# Patient Record
Sex: MALE | Race: WHITE | NOT HISPANIC OR LATINO | Employment: OTHER | URBAN - METROPOLITAN AREA
[De-identification: names, ages, dates, MRNs, and addresses within clinical notes are randomized per-mention and may not be internally consistent; named-entity substitution may affect disease eponyms.]

---

## 2017-02-03 ENCOUNTER — LAB REQUISITION (OUTPATIENT)
Dept: LAB | Facility: HOSPITAL | Age: 62
End: 2017-02-03

## 2017-02-03 DIAGNOSIS — S60.549A: ICD-10-CM

## 2017-02-03 LAB — POTASSIUM BLDA-SCNC: 3.93 MMOL/L (ref 3.5–5.3)

## 2017-02-03 PROCEDURE — 84132 ASSAY OF SERUM POTASSIUM: CPT | Performed by: PLASTIC SURGERY

## 2017-04-21 ENCOUNTER — HOSPITAL ENCOUNTER (OUTPATIENT)
Dept: CT IMAGING | Facility: HOSPITAL | Age: 62
Discharge: HOME | End: 2017-04-21
Attending: INTERNAL MEDICINE

## 2017-05-31 ENCOUNTER — APPOINTMENT (OUTPATIENT)
Dept: CT IMAGING | Facility: HOSPITAL | Age: 62
End: 2017-05-31
Attending: INTERNAL MEDICINE

## 2017-05-31 ENCOUNTER — HOSPITAL ENCOUNTER (OUTPATIENT)
Dept: CT IMAGING | Facility: HOSPITAL | Age: 62
Discharge: HOME | End: 2017-05-31
Attending: INTERNAL MEDICINE | Admitting: INTERNAL MEDICINE

## 2017-05-31 DIAGNOSIS — R91.8 LUNG NODULE, MULTIPLE: ICD-10-CM

## 2017-05-31 DIAGNOSIS — R93.89 ABNORMAL CT OF THE CHEST: ICD-10-CM

## 2017-05-31 PROCEDURE — 71250 CT THORAX DX C-: CPT

## 2017-06-06 ENCOUNTER — OFFICE VISIT (OUTPATIENT)
Dept: PULMONOLOGY | Facility: CLINIC | Age: 62
End: 2017-06-06

## 2017-06-06 VITALS
SYSTOLIC BLOOD PRESSURE: 110 MMHG | WEIGHT: 230 LBS | DIASTOLIC BLOOD PRESSURE: 64 MMHG | OXYGEN SATURATION: 98 % | RESPIRATION RATE: 16 BRPM | HEART RATE: 81 BPM | BODY MASS INDEX: 30.48 KG/M2 | HEIGHT: 73 IN

## 2017-06-06 DIAGNOSIS — R93.89 ABNORMAL CT OF THE CHEST: ICD-10-CM

## 2017-06-06 DIAGNOSIS — R06.02 SHORTNESS OF BREATH: Primary | ICD-10-CM

## 2017-06-06 DIAGNOSIS — R91.8 LUNG NODULE, MULTIPLE: ICD-10-CM

## 2017-06-06 DIAGNOSIS — J44.9 CHRONIC OBSTRUCTIVE PULMONARY DISEASE, UNSPECIFIED COPD TYPE (HCC): ICD-10-CM

## 2017-06-06 DIAGNOSIS — F17.200 SMOKING: ICD-10-CM

## 2017-06-06 PROCEDURE — 99214 OFFICE O/P EST MOD 30 MIN: CPT | Performed by: NURSE PRACTITIONER

## 2017-06-06 RX ORDER — AMLODIPINE BESYLATE 5 MG/1
TABLET ORAL
COMMUNITY
Start: 2017-05-11

## 2017-06-06 RX ORDER — AZILSARTAN KAMEDOXOMIL AND CHLORTHALIDONE 40; 25 MG/1; MG/1
TABLET ORAL
Refills: 6 | COMMUNITY
Start: 2017-05-12

## 2017-06-06 RX ORDER — ALBUTEROL SULFATE 90 UG/1
2 AEROSOL, METERED RESPIRATORY (INHALATION) EVERY 4 HOURS PRN
Qty: 1 INHALER | Refills: 5 | Status: SHIPPED | OUTPATIENT
Start: 2017-06-06 | End: 2017-12-19 | Stop reason: SDUPTHER

## 2017-06-06 NOTE — PROGRESS NOTES
"Chief Complaint   Patient presents with   • Follow-up   • Shortness of Breath         Subjective   Stephan Gibbs is a 61 y.o. male.     History of Present Illness   The patient is here for follow-up of shortness of breath, COPD, and lung nodule.     He is doing well with the medication. He uses Qvar 1 puff BID and Stiolto 1 puff BID. He feels like his breathing is doing well. He has not needed a rescue inhaler at all.     He denies any exacerbation or increased cough or phlegm production.     He continues to smoke 1/2 ppd of cigarettes.       The following portions of the patient's history were reviewed and updated as appropriate: allergies, current medications, past family history, past medical history, past social history and past surgical history.    Review of Systems   HENT: Negative for sinus pressure, sneezing and sore throat.    Respiratory: Positive for shortness of breath and wheezing. Negative for cough.        Objective   /64  Pulse 81  Resp 16  Ht 73\" (185.4 cm)  Wt 230 lb (104 kg)  SpO2 98%  BMI 30.34 kg/m2     Physical Exam   Constitutional: He is oriented to person, place, and time. He appears well-developed.   HENT:   Head: Normocephalic and atraumatic.   Eyes: EOM are normal.   Neck: Neck supple.   Cardiovascular: Normal rate and regular rhythm.    Pulmonary/Chest: Effort normal. No respiratory distress.   Somewhat decreased A/E without wheezing noted.   Musculoskeletal: He exhibits no edema.   Gait normal.   Neurological: He is alert and oriented to person, place, and time.   Skin: Skin is warm and dry.   Psychiatric: He has a normal mood and affect.   Vitals reviewed.          Assessment/Plan   Stephan was seen today for follow-up and shortness of breath.    Diagnoses and all orders for this visit:    Shortness of breath  -     Alpha - 1 - Antitrypsin Phenotype; Future    Chronic obstructive pulmonary disease, unspecified COPD type  -     Alpha - 1 - Antitrypsin Phenotype; " Future    Abnormal CT of the chest    Lung nodule, multiple    Smoking    Other orders  -     albuterol (VENTOLIN HFA) 108 (90 BASE) MCG/ACT inhaler; Inhale 2 puffs Every 4 (Four) Hours As Needed for Wheezing or Shortness of Air.           Return in about 6 months (around 12/6/2017) for Recheck, For Dr. Corey.    DISCUSSION (if any):  I reviewed the CT of the chest with the patient which shows a stable 5-6 mm nodule and will repeat December/January.    Continue Stiolto daily and Qvar twice a day.    I have given him a rescue inhaler to use as needed and to carry with him.    I advised him to quit smoking.    He did not have the lab work done so I have reordered alpha-1 antitrypsin with phenotype to be completed.        Errors in dictation may reflect use of voice recognition software and not all errors in transcription may have been detected prior to signing    This document was electronically signed by ALEXIA Barrios June 6, 2017  10:04 AM

## 2017-06-11 ENCOUNTER — RESULTS ENCOUNTER (OUTPATIENT)
Dept: PULMONOLOGY | Facility: CLINIC | Age: 62
End: 2017-06-11

## 2017-06-11 DIAGNOSIS — J44.9 CHRONIC OBSTRUCTIVE PULMONARY DISEASE, UNSPECIFIED COPD TYPE (HCC): ICD-10-CM

## 2017-06-11 DIAGNOSIS — R06.02 SHORTNESS OF BREATH: ICD-10-CM

## 2017-06-13 ENCOUNTER — LAB (OUTPATIENT)
Dept: LAB | Facility: HOSPITAL | Age: 62
End: 2017-06-13
Attending: INTERNAL MEDICINE

## 2017-06-13 ENCOUNTER — PROCEDURE VISIT (OUTPATIENT)
Dept: NEUROLOGY | Facility: CLINIC | Age: 62
End: 2017-06-13

## 2017-06-13 DIAGNOSIS — R20.0 NUMBNESS AND TINGLING: Primary | ICD-10-CM

## 2017-06-13 DIAGNOSIS — R20.2 NUMBNESS AND TINGLING: Primary | ICD-10-CM

## 2017-06-13 DIAGNOSIS — J44.9 CHRONIC OBSTRUCTIVE PULMONARY DISEASE, UNSPECIFIED COPD TYPE (HCC): ICD-10-CM

## 2017-06-13 PROCEDURE — 82104 ALPHA-1-ANTITRYPSIN PHENO: CPT | Performed by: INTERNAL MEDICINE

## 2017-06-13 PROCEDURE — 95886 MUSC TEST DONE W/N TEST COMP: CPT | Performed by: PSYCHIATRY & NEUROLOGY

## 2017-06-13 PROCEDURE — 95911 NRV CNDJ TEST 9-10 STUDIES: CPT | Performed by: PSYCHIATRY & NEUROLOGY

## 2017-06-13 PROCEDURE — 82103 ALPHA-1-ANTITRYPSIN TOTAL: CPT | Performed by: INTERNAL MEDICINE

## 2017-06-13 NOTE — PROGRESS NOTES
"Procedure   EMG & Nerve Conduction Test  Date/Time: 6/13/2017 3:12 PM  Performed by: AVTRA COOK  Authorized by: AVTAR COOK   Consent: Verbal consent obtained.              [facility]   [address], [address]  [place]  [address]   Phone: [phone]  FAX: [phone]           Patient: tushar lane YOB: 1955  Gender: Male  Reason for study: see below in history section      Visit Date: 6/13/2017 15:20  Age: 61 Years 9 Months Old  Examining Physician: Kam       The patient has a chief complaint and history of bilateral, lower extremity, numbness,, pain,    The patient is referred to have this problem evaluated today with EMG and nerve conduction studies.  The performing physician also acted as a technician on this study.  Please note that in the table \"NR\" stands for \"no response\" therefore testing was performed, but no response was elicited.    A brief neurological exam, revealed no abnormalities in muscle bulk strength reflexes and sensationbilateral, lower extremity, numbness,      Sensory NCS      Nerve / Sites Rec. Site Distance Onset Lat NP Amp Onset Shane     cm ms µV m/s   L Sural - Ankle (Calf)      Calf Ankle 14 3.9 1.8 36   R Sural - Ankle (Calf)      Calf Ankle 14 4.0 1.1 35   L Superficial peroneal - Ankle      Lat leg Ankle 8 2.7 1.2 30       Motor NCS      Nerve / Sites Muscle Distance Latency Amplitude Velocity     cm ms mV m/s   L Peroneal - EDB      Ankle EDB 8 11.41 2.1       Fib head EDB 30 19.48 2.0 37   R Peroneal - EDB      Ankle EDB 8 4.84 5.0       Fib head EDB 30 13.39 4.5 35   L Tibial - AH      Ankle AH 8 4.17 2.6       Pop fossa AH 43 15.89 2.2 37   R Tibial - AH      Ankle AH 8 6.72 1.7       Pop fossa AH 43 18.28 1.7 37       F  Wave      Nerve F-min    ms   L Tibial - AH 53   L Peroneal - EDB 67   R Peroneal - EDB 61   R Tibial - AH 65       H Reflex      Nerve H Lat    ms   L Tibial - Soleus 47.9   R Tibial - Soleus " 45.5       EMG Summary Table     Spontaneous MUAP Recruitment    IA Fib PSW Fasc H.F. Amp Dur. PPP Pattern   L. GASTROCN (MED) N None None None None N N N N   L. TIB ANTERIOR N None None None None N N N N   L. ILIOPSOAS N None None None None N N N N   L. QUADRICEPS N None None None None N N N N   L. GLUTEUS MAX N None None None None N N N N   L. L.PARASPINALS N None None None None N N N N   R. L.PARASPINALS N None None None None N N N N   R. GASTROCN (MED) N None None None None N N N N   R. TIB ANTERIOR N None None None None N N N N   R. ILIOPSOAS N None None None None N N N N   R. QUADRICEPS N None None None None N N N N   R. GLUTEUS MAX N None None None None N N N N   1.) SNAPs were mildly slow.  2.) CMAPs were normal  3.) F-waves were of normal minimal latency where elicitable.  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    The referring health care provider will discuss this report and possible further diagnostic and management options with the patient.      Elver Humphrey M.D.                     Diagnoses and all orders for this visit:    Numbness and tingling  -     EMG & Nerve Conduction Test

## 2017-06-16 LAB
A1AT SERPL-MCNC: 82 MG/DL (ref 90–200)
PHENOTYPE: ABNORMAL

## 2017-12-19 RX ORDER — ALBUTEROL SULFATE 90 UG/1
2 AEROSOL, METERED RESPIRATORY (INHALATION) EVERY 4 HOURS PRN
Qty: 1 INHALER | Refills: 0 | Status: SHIPPED | OUTPATIENT
Start: 2017-12-19 | End: 2022-09-13

## 2017-12-19 RX ORDER — TIOTROPIUM BROMIDE AND OLODATEROL 3.124; 2.736 UG/1; UG/1
2 SPRAY, METERED RESPIRATORY (INHALATION) DAILY
Qty: 1 INHALER | Refills: 0 | Status: SHIPPED | OUTPATIENT
Start: 2017-12-19 | End: 2017-12-27 | Stop reason: SDUPTHER

## 2017-12-27 ENCOUNTER — OFFICE VISIT (OUTPATIENT)
Dept: PULMONOLOGY | Facility: CLINIC | Age: 62
End: 2017-12-27

## 2017-12-27 VITALS
HEART RATE: 103 BPM | BODY MASS INDEX: 31.14 KG/M2 | DIASTOLIC BLOOD PRESSURE: 72 MMHG | HEIGHT: 73 IN | RESPIRATION RATE: 16 BRPM | WEIGHT: 235 LBS | SYSTOLIC BLOOD PRESSURE: 110 MMHG | OXYGEN SATURATION: 97 %

## 2017-12-27 DIAGNOSIS — J44.9 CHRONIC OBSTRUCTIVE PULMONARY DISEASE, UNSPECIFIED COPD TYPE (HCC): ICD-10-CM

## 2017-12-27 DIAGNOSIS — R91.1 LUNG NODULE: ICD-10-CM

## 2017-12-27 DIAGNOSIS — R06.02 SHORTNESS OF BREATH: Primary | ICD-10-CM

## 2017-12-27 DIAGNOSIS — R93.89 ABNORMAL CT OF THE CHEST: ICD-10-CM

## 2017-12-27 DIAGNOSIS — F17.200 SMOKING: ICD-10-CM

## 2017-12-27 PROCEDURE — 99214 OFFICE O/P EST MOD 30 MIN: CPT | Performed by: NURSE PRACTITIONER

## 2017-12-27 RX ORDER — TIOTROPIUM BROMIDE AND OLODATEROL 3.124; 2.736 UG/1; UG/1
2 SPRAY, METERED RESPIRATORY (INHALATION) DAILY
Qty: 1 INHALER | Refills: 5 | Status: SHIPPED | OUTPATIENT
Start: 2017-12-27 | End: 2022-09-13

## 2018-01-26 ENCOUNTER — HOSPITAL ENCOUNTER (OUTPATIENT)
Dept: CT IMAGING | Facility: HOSPITAL | Age: 63
Discharge: HOME | End: 2018-01-26

## 2018-01-30 ENCOUNTER — HOSPITAL ENCOUNTER (OUTPATIENT)
Dept: CT IMAGING | Facility: HOSPITAL | Age: 63
Discharge: HOME | End: 2018-01-30
Admitting: NURSE PRACTITIONER

## 2018-01-30 DIAGNOSIS — R91.1 LUNG NODULE: ICD-10-CM

## 2018-01-30 DIAGNOSIS — R93.89 ABNORMAL CT OF THE CHEST: ICD-10-CM

## 2018-01-30 PROCEDURE — 71250 CT THORAX DX C-: CPT

## 2018-01-31 NOTE — PROGRESS NOTES
Please call the patient regarding his CT report. The nodule has decreased in size otherwise there is no change from previous CT.

## 2018-11-20 RX ORDER — TIOTROPIUM BROMIDE AND OLODATEROL 3.124; 2.736 UG/1; UG/1
SPRAY, METERED RESPIRATORY (INHALATION)
Refills: 4 | OUTPATIENT
Start: 2018-11-20

## 2018-11-29 RX ORDER — TIOTROPIUM BROMIDE AND OLODATEROL 3.124; 2.736 UG/1; UG/1
SPRAY, METERED RESPIRATORY (INHALATION)
Refills: 4 | OUTPATIENT
Start: 2018-11-29

## 2018-12-04 RX ORDER — TIOTROPIUM BROMIDE AND OLODATEROL 3.124; 2.736 UG/1; UG/1
SPRAY, METERED RESPIRATORY (INHALATION)
Refills: 4 | OUTPATIENT
Start: 2018-12-04

## 2020-05-22 ENCOUNTER — TRANSCRIBE ORDERS (OUTPATIENT)
Dept: ADMINISTRATIVE | Facility: HOSPITAL | Age: 65
End: 2020-05-22

## 2020-05-22 DIAGNOSIS — Z87.891 PERSONAL HISTORY OF TOBACCO USE, PRESENTING HAZARDS TO HEALTH: Primary | ICD-10-CM

## 2020-06-05 ENCOUNTER — HOSPITAL ENCOUNTER (OUTPATIENT)
Dept: CT IMAGING | Facility: HOSPITAL | Age: 65
Discharge: HOME | End: 2020-06-05
Admitting: FAMILY MEDICINE

## 2020-06-05 DIAGNOSIS — Z87.891 PERSONAL HISTORY OF TOBACCO USE, PRESENTING HAZARDS TO HEALTH: ICD-10-CM

## 2020-06-05 PROCEDURE — G0297 LDCT FOR LUNG CA SCREEN: HCPCS

## 2020-11-30 ENCOUNTER — TRANSCRIBE ORDERS (OUTPATIENT)
Dept: ADMINISTRATIVE | Facility: HOSPITAL | Age: 65
End: 2020-11-30

## 2020-11-30 DIAGNOSIS — Z87.891 PERSONAL HISTORY OF TOBACCO USE, PRESENTING HAZARDS TO HEALTH: Primary | ICD-10-CM

## 2021-06-16 ENCOUNTER — HOSPITAL ENCOUNTER (OUTPATIENT)
Dept: CT IMAGING | Facility: HOSPITAL | Age: 66
Discharge: HOME | End: 2021-06-16
Admitting: FAMILY MEDICINE

## 2021-06-16 DIAGNOSIS — Z87.891 PERSONAL HISTORY OF TOBACCO USE, PRESENTING HAZARDS TO HEALTH: ICD-10-CM

## 2021-06-16 PROCEDURE — 71271 CT THORAX LUNG CANCER SCR C-: CPT

## 2022-09-13 ENCOUNTER — OFFICE VISIT (OUTPATIENT)
Dept: PULMONOLOGY | Facility: CLINIC | Age: 67
End: 2022-09-13

## 2022-09-13 VITALS
DIASTOLIC BLOOD PRESSURE: 64 MMHG | OXYGEN SATURATION: 93 % | SYSTOLIC BLOOD PRESSURE: 110 MMHG | HEART RATE: 100 BPM | HEIGHT: 73 IN | BODY MASS INDEX: 31.41 KG/M2 | WEIGHT: 237 LBS | RESPIRATION RATE: 25 BRPM

## 2022-09-13 DIAGNOSIS — J44.9 CHRONIC OBSTRUCTIVE PULMONARY DISEASE, UNSPECIFIED COPD TYPE: ICD-10-CM

## 2022-09-13 DIAGNOSIS — R06.02 SHORTNESS OF BREATH: Primary | ICD-10-CM

## 2022-09-13 DIAGNOSIS — F17.210 NICOTINE DEPENDENCE, CIGARETTES, UNCOMPLICATED: ICD-10-CM

## 2022-09-13 DIAGNOSIS — J41.0 CHRONIC BRONCHITIS, SIMPLE: ICD-10-CM

## 2022-09-13 DIAGNOSIS — Z14.8 ALPHA-1-ANTITRYPSIN DEFICIENCY CARRIER: ICD-10-CM

## 2022-09-13 PROCEDURE — 99204 OFFICE O/P NEW MOD 45 MIN: CPT | Performed by: INTERNAL MEDICINE

## 2022-09-13 RX ORDER — TAMSULOSIN HYDROCHLORIDE 0.4 MG/1
CAPSULE ORAL
COMMUNITY
Start: 2022-09-07

## 2022-09-13 RX ORDER — ALBUTEROL SULFATE 90 UG/1
2 AEROSOL, METERED RESPIRATORY (INHALATION) EVERY 4 HOURS PRN
Qty: 18 G | Refills: 5 | Status: SHIPPED | OUTPATIENT
Start: 2022-09-13

## 2022-09-13 RX ORDER — TESTOSTERONE CYPIONATE 200 MG/ML
INJECTION INTRAMUSCULAR
COMMUNITY
Start: 2022-07-14

## 2022-09-13 RX ORDER — GABAPENTIN 300 MG/1
CAPSULE ORAL
COMMUNITY
Start: 2022-06-24

## 2022-09-13 NOTE — PROGRESS NOTES
"  CONSULT NOTE    Requested by:   Bryce Parkinson MD      Chief Complaint   Patient presents with   • Breathing Problem   • Consult       Subjective:  Stephan Gibbs is a 67 y.o. male.     History of Present Illness   Patient comes in today for evaluation of shortness of breath. Patient says that for the past few years, he has been noticing that his exercise tolerance has been declining. The patient has had days when walking any significant distance is difficult to do without stopping in the middle, to catch his breath.     Patient also complains of some cough and phlegm production that occurs on most mornings out of the week, for most weeks out of the month, for the past few years. Patient used to smoke 1 pack per day for the past 55 years and is currently smoking 1 pack a day.     he is currently not on oxygen.     he does have a family history of chronic obstructive disease, in his aunt and father.     He used to be a hussein.     The following portions of the patient's history were reviewed and updated as appropriate: allergies, current medications, past family history, past medical history, past social history and past surgical history.    Review of Systems   HENT: Negative for sinus pressure.    Respiratory: Positive for shortness of breath.    Cardiovascular: Positive for palpitations.   Psychiatric/Behavioral: Negative for sleep disturbance.   All other systems reviewed and are negative.      Past Medical History:   Diagnosis Date   • Chronic bronchitis (HCC)    • Hypertension        Social History     Tobacco Use   • Smoking status: Current Every Day Smoker     Packs/day: 0.50     Years: 50.00     Pack years: 25.00     Start date: 6/1/1966   • Smokeless tobacco: Never Used   Substance Use Topics   • Alcohol use: Yes         Objective:  Visit Vitals  /64   Pulse 100   Resp 25   Ht 185.4 cm (73\")   Wt 108 kg (237 lb)   SpO2 93%   BMI 31.27 kg/m²       Physical Exam  Vitals reviewed. "   Constitutional:       Appearance: He is well-developed.   HENT:      Head:      Comments: No acute lesions noted     Nose:      Comments: Mild nasal erythema noted.     Mouth/Throat:      Mouth: Mucous membranes are moist.   Neck:      Vascular: No JVD.   Cardiovascular:      Rate and Rhythm: Normal rate and regular rhythm.   Pulmonary:      Effort: Pulmonary effort is normal.      Breath sounds: Wheezing present. No rales.      Comments: Somewhat hyperresonant to percussion.  Somewhat decreased air entry.  Mild scattered wheezing noted.   Musculoskeletal:      Cervical back: Neck supple.      Right lower leg: No edema.      Left lower leg: No edema.      Comments: Gait was normal.   Skin:     General: Skin is warm and dry.   Neurological:      Mental Status: He is alert and oriented to person, place, and time.   Psychiatric:         Mood and Affect: Mood normal.         Behavior: Behavior normal.           Assessment/Plan:  Diagnoses and all orders for this visit:    1. Shortness of breath (Primary)  -     Pulmonary Function Test; Future    2. Chronic obstructive pulmonary disease, unspecified COPD type (HCC)  -     Pulmonary Function Test; Future    3. Chronic bronchitis, simple (HCC)    4. Nicotine dependence, cigarettes, uncomplicated  -      CT Chest Low Dose Cancer Screening WO; Future    5. Alpha-1-antitrypsin deficiency carrier    Other orders  -     albuterol sulfate HFA (Ventolin HFA) 108 (90 Base) MCG/ACT inhaler; Inhale 2 puffs Every 4 (Four) Hours As Needed for Wheezing or Shortness of Air.  Dispense: 18 g; Refill: 5  -     tiotropium bromide-olodaterol (STIOLTO RESPIMAT) 2.5-2.5 MCG/ACT aerosol solution inhaler; Inhale 2 puffs Daily.  Dispense: 1 each; Refill: 5        Return in about 4 months (around 1/13/2023) for Recheck, PFT F/U, For Ghosh ([place]).    DISCUSSION(if any):  Last CT scan was reviewed in great detail with the patient. Images reviewed personally.   Results for orders placed during  the hospital encounter of 06/16/21    CT Chest Low Dose Cancer Screening WO    Narrative  PROCEDURE: CT CHEST LOW DOSE CANCER SCREENING WO-    HISTORY: Screening; Z87.891-Personal history of nicotine dependence    COMPARISON: June 5, 2020    TECHNIQUE: Axial images were obtained from the thoracic inlet through  the upper abdomen per the low-dose protocol. Coronal images were  reformatted. DLP 79.70 mGy.cm; CTDI 2.19 mGy    FINDINGS: There is centrilobular emphysema. There are no calcified or noncalcified nodules. There is no airspace disease. The heart is normal in size. The aorta is normal in caliber. There are no pleural or pericardial effusions. The visualized upper abdomen is unremarkable.   Bone windows reveal no acute osseous abnormalities.    Impression  No suspicious pulmonary mass or nodule. Lung RADS category  1.    RECOMMENDATIONS:Annual low-dose chest CT.      Images were reviewed, interpreted, and dictated by Dr. Verena Armstrong M.D.  Transcribed by Estrella Segura PA-C.    This report was finalized on 6/17/2021 12:43 PM by Verena Armstrong M.D..      I have also reviewed his provider's office note that mentions COPD, chronic cough and continued smoking.     ===========================  ===========================    Last PFTs, from 2016, were reviewed. Moderate COPD noted.    PFTs were ordered for follow up visit.     Laboratory workup also showed   Lab Results   Component Value Date    HCT 45.8 03/03/2016     Lab Results   Component Value Date    AFPTM 82 (L) 06/13/2017     Lab Results   Component Value Date    PHENOTYPE MZ 06/13/2017     ===========================  ===========================    Orders as above.    I have told the patient, that in my view, shortness of breath is most likely from underlying chronic obstructive lung disease.     he is aware of alpha 1 antitrypsin carrier state and the need to possibly discuss this with his children and siblings. The patient's own levels are not  significantly abnormal at this time.     Patient was given education and demonstration on how to use the medicine.     Side effects, of prescribed medicines, discussed.    Patient was also instructed on compliance and adherence with instructions.     I have discussed the need to quit smoking as soon as possible.    Patient refuses to quit smoking.     Patient was given reading material, as appropriate.     The patient belongs to the risk group for which lung cancer screening has been recommended. We will try to make arrangements for the same and this will be indicated soon. This has been ordered.    Patient was asked to call with any concerns.     Patient will be followed clinically to assess for response to treatment and further recommendations will be made, based on response.    Refuses to use oxygen at night, even if he needs it. Will not order overnight pulse oximetry at this time.       Dictated utilizing Dragon dictation.    This document was electronically signed by Chante Corey MD on 09/13/22 at 16:48 EDT

## 2022-09-21 ENCOUNTER — PATIENT ROUNDING (BHMG ONLY) (OUTPATIENT)
Dept: PULMONOLOGY | Facility: CLINIC | Age: 67
End: 2022-09-21

## 2022-09-29 ENCOUNTER — HOSPITAL ENCOUNTER (OUTPATIENT)
Dept: CT IMAGING | Facility: HOSPITAL | Age: 67
End: 2022-09-29

## 2022-09-30 ENCOUNTER — HOSPITAL ENCOUNTER (OUTPATIENT)
Dept: CT IMAGING | Facility: HOSPITAL | Age: 67
Discharge: HOME | End: 2022-09-30
Admitting: INTERNAL MEDICINE

## 2022-09-30 DIAGNOSIS — F17.210 NICOTINE DEPENDENCE, CIGARETTES, UNCOMPLICATED: ICD-10-CM

## 2022-09-30 PROCEDURE — 71271 CT THORAX LUNG CANCER SCR C-: CPT

## 2023-04-06 RX ORDER — TIOTROPIUM BROMIDE AND OLODATEROL 3.124; 2.736 UG/1; UG/1
SPRAY, METERED RESPIRATORY (INHALATION)
Qty: 4 G | Refills: 1 | Status: SHIPPED | OUTPATIENT
Start: 2023-04-06

## 2023-05-04 ENCOUNTER — OFFICE VISIT (OUTPATIENT)
Dept: PULMONOLOGY | Facility: CLINIC | Age: 68
End: 2023-05-04
Payer: MEDICARE

## 2023-05-04 VITALS
SYSTOLIC BLOOD PRESSURE: 110 MMHG | HEIGHT: 73 IN | RESPIRATION RATE: 18 BRPM | DIASTOLIC BLOOD PRESSURE: 70 MMHG | BODY MASS INDEX: 31.81 KG/M2 | OXYGEN SATURATION: 94 % | HEART RATE: 83 BPM | WEIGHT: 240 LBS

## 2023-05-04 DIAGNOSIS — Z14.8 ALPHA-1-ANTITRYPSIN DEFICIENCY CARRIER: ICD-10-CM

## 2023-05-04 DIAGNOSIS — F17.210 NICOTINE DEPENDENCE, CIGARETTES, UNCOMPLICATED: Primary | ICD-10-CM

## 2023-05-04 DIAGNOSIS — J44.9 CHRONIC OBSTRUCTIVE PULMONARY DISEASE, UNSPECIFIED COPD TYPE: ICD-10-CM

## 2023-05-04 DIAGNOSIS — R06.02 SHORTNESS OF BREATH: ICD-10-CM

## 2023-05-04 PROCEDURE — 99214 OFFICE O/P EST MOD 30 MIN: CPT | Performed by: NURSE PRACTITIONER

## 2023-05-04 RX ORDER — FINASTERIDE 5 MG/1
1 TABLET, FILM COATED ORAL DAILY
COMMUNITY
Start: 2023-03-16

## 2023-05-04 RX ORDER — ALBUTEROL SULFATE 90 UG/1
2 AEROSOL, METERED RESPIRATORY (INHALATION) EVERY 4 HOURS PRN
Qty: 18 G | Refills: 5 | Status: SHIPPED | OUTPATIENT
Start: 2023-05-04

## 2023-05-04 RX ORDER — TIOTROPIUM BROMIDE AND OLODATEROL 3.124; 2.736 UG/1; UG/1
2 SPRAY, METERED RESPIRATORY (INHALATION) DAILY
Qty: 12 G | Refills: 2 | Status: SHIPPED | OUTPATIENT
Start: 2023-05-04

## 2023-05-04 NOTE — PROGRESS NOTES
"Chief Complaint   Patient presents with   • Follow-up   • Shortness of Breath         Subjective   Stephan Gibbs is a 67 y.o. male.     History of Present Illness   Patient comes today for follow up of shortness of breath and COPD.     Symptoms have been stable since the last clinic visit. Patient reports no recent exacerbations.     Patient is using medications, as prescribed. He uses Stiolto daily and rescue inhaler mainly when hiking.     He walks 2 miles 1-2 times a week and walks on treadmill during winter months. He does not need KADEN during regular walks.    Exercise tolerance has also remained stable.     Continues to smoke 1 pack per day.    The following portions of the patient's history were reviewed and updated as appropriate: allergies, current medications, past family history, past medical history, past social history and past surgical history.      Review of Systems   HENT: Negative for sinus pressure, sinus pain and sore throat.    Respiratory: Negative for cough, chest tightness and shortness of breath.        Objective   Visit Vitals  /70   Pulse 83   Resp 18   Ht 185.4 cm (73\") Comment: patient reported   Wt 109 kg (240 lb)   SpO2 94%   BMI 31.66 kg/m²         Physical Exam  Vitals reviewed.   HENT:      Head: Atraumatic.      Mouth/Throat:      Mouth: Mucous membranes are moist.   Eyes:      Extraocular Movements: Extraocular movements intact.   Cardiovascular:      Rate and Rhythm: Normal rate and regular rhythm.   Pulmonary:      Effort: Pulmonary effort is normal. No respiratory distress.      Breath sounds: Normal breath sounds. No wheezing.   Musculoskeletal:      Cervical back: Neck supple.      Comments: Gait normal.   Skin:     General: Skin is warm.   Neurological:      Mental Status: He is alert and oriented to person, place, and time.           Assessment & Plan   Diagnoses and all orders for this visit:    1. Nicotine dependence, cigarettes, uncomplicated (Primary)  - "      CT Chest Low Dose Cancer Screening WO; Future    2. Chronic obstructive pulmonary disease, unspecified COPD type    3. Alpha-1-antitrypsin deficiency carrier    Other orders  -     albuterol sulfate HFA (Ventolin HFA) 108 (90 Base) MCG/ACT inhaler; Inhale 2 puffs Every 4 (Four) Hours As Needed for Wheezing or Shortness of Air.  Dispense: 18 g; Refill: 5  -     tiotropium bromide-olodaterol (Stiolto Respimat) 2.5-2.5 MCG/ACT aerosol solution inhaler; Inhale 2 puffs Daily.  Dispense: 12 g; Refill: 2           Return in about 8 months (around 1/4/2024) for Recheck, For Dr. Corey, Imaging studies.    DISCUSSION (if any):  PFT today consistent with moderate obstruction. No restriction with air trapping suggested. Preserved diffusion capacity.     No change to the current medications has been made. He should continue Stiolto and KADEN as directed.     Compliance with medications stressed.     Side effects of prescribed medications discussed with the patient.    Patient was strongly encouraged to quit smoking as soon as possible.    The patient belongs to the risk group for which lung cancer screening has been recommended. He will be due annual LDCT Sept 2023.      No pulmonary nodules on previous CT. Emphysema noted.     Study Result    Narrative & Impression   PROCEDURE: CT CHEST LOW DOSE CANCER SCREENING WO-     HISTORY: Lung cancer screening, >= 20 pk-yr smoking history (Age >=  50y); F17.210-Nicotine dependence, cigarettes, uncomplicated     COMPARISON: June 2021     TECHNIQUE: Axial images were obtained from the thoracic inlet through  the upper abdomen per the low-dose protocol. Coronal images were  reformatted. DLP 81.92 mGy.cm; CTDI 2.15 mGy     FINDINGS: There is mild emphysema. There are no suspicious calcified or  noncalcified nodules. There is no airspace disease. The heart is normal  in size. The aorta is normal in caliber. There are no pleural or  pericardial effusions. The visualized upper abdomen is  unremarkable.  Bone windows reveal no acute osseous abnormalities.     IMPRESSION:  No suspicious pulmonary mass or nodule. Lung RADS category  1.     RECOMMENDATIONS:Annual low-dose chest CT.           Images were reviewed, interpreted, and dictated by Dr. Wil Davies MD  Transcribed by Estrella Segura PA-C.     This report was signed and finalized          Dictated utilizing Dragon dictation.    This document was electronically signed by ALEXIA Barrios May 5, 2023  13:06 EDT

## 2023-06-12 ENCOUNTER — PRE-ADMISSION TESTING (OUTPATIENT)
Dept: PREADMISSION TESTING | Facility: HOSPITAL | Age: 68
End: 2023-06-12
Payer: MEDICARE

## 2023-06-12 LAB
ANION GAP SERPL CALCULATED.3IONS-SCNC: 10 MMOL/L (ref 5–15)
BASOPHILS # BLD AUTO: 0.01 10*3/MM3 (ref 0–0.2)
BASOPHILS NFR BLD AUTO: 0.1 % (ref 0–1.5)
BUN SERPL-MCNC: 38 MG/DL (ref 8–23)
BUN/CREAT SERPL: 27.7 (ref 7–25)
CALCIUM SPEC-SCNC: 9.9 MG/DL (ref 8.6–10.5)
CHLORIDE SERPL-SCNC: 102 MMOL/L (ref 98–107)
CO2 SERPL-SCNC: 29 MMOL/L (ref 22–29)
CREAT SERPL-MCNC: 1.37 MG/DL (ref 0.76–1.27)
DEPRECATED RDW RBC AUTO: 47.7 FL (ref 37–54)
EGFRCR SERPLBLD CKD-EPI 2021: 55.8 ML/MIN/1.73
EOSINOPHIL # BLD AUTO: 0.21 10*3/MM3 (ref 0–0.4)
EOSINOPHIL NFR BLD AUTO: 2.9 % (ref 0.3–6.2)
ERYTHROCYTE [DISTWIDTH] IN BLOOD BY AUTOMATED COUNT: 13.8 % (ref 12.3–15.4)
GLUCOSE SERPL-MCNC: 135 MG/DL (ref 65–99)
HCT VFR BLD AUTO: 46.7 % (ref 37.5–51)
HGB BLD-MCNC: 15.4 G/DL (ref 13–17.7)
IMM GRANULOCYTES # BLD AUTO: 0.02 10*3/MM3 (ref 0–0.05)
IMM GRANULOCYTES NFR BLD AUTO: 0.3 % (ref 0–0.5)
LYMPHOCYTES # BLD AUTO: 1.49 10*3/MM3 (ref 0.7–3.1)
LYMPHOCYTES NFR BLD AUTO: 20.3 % (ref 19.6–45.3)
MCH RBC QN AUTO: 30.9 PG (ref 26.6–33)
MCHC RBC AUTO-ENTMCNC: 33 G/DL (ref 31.5–35.7)
MCV RBC AUTO: 93.8 FL (ref 79–97)
MONOCYTES # BLD AUTO: 0.73 10*3/MM3 (ref 0.1–0.9)
MONOCYTES NFR BLD AUTO: 10 % (ref 5–12)
NEUTROPHILS NFR BLD AUTO: 4.87 10*3/MM3 (ref 1.7–7)
NEUTROPHILS NFR BLD AUTO: 66.4 % (ref 42.7–76)
NRBC BLD AUTO-RTO: 0 /100 WBC (ref 0–0.2)
PLATELET # BLD AUTO: 182 10*3/MM3 (ref 140–450)
PMV BLD AUTO: 11.4 FL (ref 6–12)
POTASSIUM SERPL-SCNC: 4.2 MMOL/L (ref 3.5–5.2)
QT INTERVAL: 354 MS
QTC INTERVAL: 435 MS
RBC # BLD AUTO: 4.98 10*6/MM3 (ref 4.14–5.8)
SODIUM SERPL-SCNC: 141 MMOL/L (ref 136–145)
WBC NRBC COR # BLD: 7.33 10*3/MM3 (ref 3.4–10.8)

## 2023-06-12 PROCEDURE — 85025 COMPLETE CBC W/AUTO DIFF WBC: CPT

## 2023-06-12 PROCEDURE — 36415 COLL VENOUS BLD VENIPUNCTURE: CPT

## 2023-06-12 PROCEDURE — 80048 BASIC METABOLIC PNL TOTAL CA: CPT

## 2023-06-12 PROCEDURE — 93005 ELECTROCARDIOGRAM TRACING: CPT

## 2023-06-22 LAB
QT INTERVAL: 354 MS
QTC INTERVAL: 435 MS

## 2023-09-27 ENCOUNTER — TELEPHONE (OUTPATIENT)
Dept: PULMONOLOGY | Facility: CLINIC | Age: 68
End: 2023-09-27
Payer: COMMERCIAL

## 2023-09-27 ENCOUNTER — DOCUMENTATION (OUTPATIENT)
Dept: PULMONOLOGY | Facility: CLINIC | Age: 68
End: 2023-09-27
Payer: COMMERCIAL

## 2023-09-27 NOTE — PROGRESS NOTES
From Pulmonary stand point, his risk for the proposed procedure appears to be.  Moderate       Postoperative recommendations:            * Out of bed to chair, as soon as feasible.            * Albuterol & Atrovent nebulizers Q4hr PRN            * Incentive spirometry every hour.            * Minimize the use of NG tube.            * Keep O2sat at 88% or more, with minimum supplemental O2.            * Minimize anesthesia time, as much as possible    Pulmonary risk stratification needs to be discussed with the physician performing the procedure and, apart from procedures involving pulmonary resection, should not be used alone to determine patient's appropriateness for the planned procedure.    This document was electronically signed by Chante Corey MD on 09/27/23 at 16:06 EDT

## 2023-09-27 NOTE — TELEPHONE ENCOUNTER
.    Caller: Stephan Gibbs    Relationship: Self    Best call back number: [phone]    What form or medical record are you requesting:A LETTER TO CLEAR HIM FOR KNEE SURGERY    Who is requesting this form or medical record from you: SELF    How would you like to receive the form or medical records (pick-up, mail, fax): EMAIL  If fax, what is the fax number:  If mail, what is the address:   If pick-up, provide patient with address and location details    Timeframe paperwork needed:     Additional notes: PATIENT  IS HAVING KNEE SURGERY ON 10/10 BUT HE NEEDS A LETTER SENT FROM  TO HIS SURGEON STATING HE IS CLEARED TO HAVE SURGERY. PT WOULD LIKE LETTER EMAILED TO HIM BECAUSE HE CANNOT HEAR SO WELL.   [email or web address]

## 2023-09-28 NOTE — TELEPHONE ENCOUNTER
Called to see if pt received his clearance that was emailed to him via-fax. He stated he has received it.

## 2023-10-06 ENCOUNTER — HOSPITAL ENCOUNTER (OUTPATIENT)
Dept: CT IMAGING | Facility: HOSPITAL | Age: 68
Discharge: HOME | End: 2023-10-06
Admitting: NURSE PRACTITIONER
Payer: MEDICARE

## 2023-10-06 DIAGNOSIS — F17.210 NICOTINE DEPENDENCE, CIGARETTES, UNCOMPLICATED: ICD-10-CM

## 2023-10-06 PROCEDURE — 71271 CT THORAX LUNG CANCER SCR C-: CPT

## 2023-12-22 ENCOUNTER — HOSPITAL ENCOUNTER (OUTPATIENT)
Dept: ULTRASOUND IMAGING | Facility: HOSPITAL | Age: 68
Discharge: HOME | End: 2023-12-22
Payer: MEDICARE

## 2023-12-22 ENCOUNTER — TRANSCRIBE ORDERS (OUTPATIENT)
Dept: ULTRASOUND IMAGING | Facility: HOSPITAL | Age: 68
End: 2023-12-22
Payer: COMMERCIAL

## 2023-12-22 DIAGNOSIS — R60.0 LOCALIZED EDEMA: Primary | ICD-10-CM

## 2023-12-22 DIAGNOSIS — R52 PAIN: ICD-10-CM

## 2023-12-22 DIAGNOSIS — R60.0 LOCALIZED EDEMA: ICD-10-CM

## 2023-12-22 PROCEDURE — 93971 EXTREMITY STUDY: CPT

## 2024-05-03 PROBLEM — M13.0 POLYARTHRITIS: Status: ACTIVE | Noted: 2024-05-03

## 2024-05-07 ENCOUNTER — OFFICE VISIT (OUTPATIENT)
Age: 69
End: 2024-05-07
Payer: MEDICARE

## 2024-05-07 VITALS
BODY MASS INDEX: 30.91 KG/M2 | HEIGHT: 73 IN | HEART RATE: 85 BPM | SYSTOLIC BLOOD PRESSURE: 120 MMHG | TEMPERATURE: 98 F | WEIGHT: 233.2 LBS | DIASTOLIC BLOOD PRESSURE: 80 MMHG

## 2024-05-07 DIAGNOSIS — M21.942 HAND DEFORMITY, LEFT: ICD-10-CM

## 2024-05-07 DIAGNOSIS — E79.0 HYPERURICEMIA: ICD-10-CM

## 2024-05-07 DIAGNOSIS — M25.551 BILATERAL HIP PAIN: ICD-10-CM

## 2024-05-07 DIAGNOSIS — M25.561 RIGHT KNEE PAIN, UNSPECIFIED CHRONICITY: ICD-10-CM

## 2024-05-07 DIAGNOSIS — M25.50 ARTHRALGIA, UNSPECIFIED JOINT: Primary | ICD-10-CM

## 2024-05-07 DIAGNOSIS — M25.552 BILATERAL HIP PAIN: ICD-10-CM

## 2024-05-07 PROCEDURE — 99214 OFFICE O/P EST MOD 30 MIN: CPT | Performed by: INTERNAL MEDICINE

## 2024-05-07 PROCEDURE — 1160F RVW MEDS BY RX/DR IN RCRD: CPT | Performed by: INTERNAL MEDICINE

## 2024-05-07 PROCEDURE — 1159F MED LIST DOCD IN RCRD: CPT | Performed by: INTERNAL MEDICINE

## 2024-05-09 ENCOUNTER — OFFICE VISIT (OUTPATIENT)
Age: 69
End: 2024-05-09
Payer: MEDICARE

## 2024-05-09 VITALS
WEIGHT: 228 LBS | DIASTOLIC BLOOD PRESSURE: 80 MMHG | SYSTOLIC BLOOD PRESSURE: 145 MMHG | BODY MASS INDEX: 30.22 KG/M2 | HEIGHT: 73 IN

## 2024-05-09 DIAGNOSIS — M79.642 LEFT HAND PAIN: Primary | ICD-10-CM

## 2024-05-09 DIAGNOSIS — M72.0 DUPUYTREN'S CONTRACTURE OF LEFT HAND: ICD-10-CM

## 2024-05-09 RX ORDER — LIDOCAINE HYDROCHLORIDE 10 MG/ML
0.5 INJECTION, SOLUTION EPIDURAL; INFILTRATION; INTRACAUDAL; PERINEURAL
Status: COMPLETED | OUTPATIENT
Start: 2024-05-09 | End: 2024-05-09

## 2024-05-09 RX ORDER — TRIAMCINOLONE ACETONIDE 40 MG/ML
20 INJECTION, SUSPENSION INTRA-ARTICULAR; INTRAMUSCULAR
Status: COMPLETED | OUTPATIENT
Start: 2024-05-09 | End: 2024-05-09

## 2024-05-09 RX ADMIN — TRIAMCINOLONE ACETONIDE 20 MG: 40 INJECTION, SUSPENSION INTRA-ARTICULAR; INTRAMUSCULAR at 13:40

## 2024-05-09 RX ADMIN — LIDOCAINE HYDROCHLORIDE 0.5 ML: 10 INJECTION, SOLUTION EPIDURAL; INFILTRATION; INTRACAUDAL; PERINEURAL at 13:40

## 2024-05-13 ENCOUNTER — TELEPHONE (OUTPATIENT)
Age: 69
End: 2024-05-13
Payer: MEDICARE

## 2024-05-13 ENCOUNTER — OFFICE VISIT (OUTPATIENT)
Age: 69
End: 2024-05-13
Payer: MEDICARE

## 2024-05-13 VITALS
HEIGHT: 73 IN | BODY MASS INDEX: 30.09 KG/M2 | SYSTOLIC BLOOD PRESSURE: 150 MMHG | WEIGHT: 227.07 LBS | DIASTOLIC BLOOD PRESSURE: 88 MMHG

## 2024-05-13 DIAGNOSIS — Z72.0 TOBACCO ABUSE: ICD-10-CM

## 2024-05-13 DIAGNOSIS — M25.551 RIGHT HIP PAIN: ICD-10-CM

## 2024-05-13 DIAGNOSIS — M25.552 LEFT HIP PAIN: ICD-10-CM

## 2024-05-13 DIAGNOSIS — M25.551 BILATERAL HIP PAIN: Primary | ICD-10-CM

## 2024-05-13 DIAGNOSIS — M25.552 BILATERAL HIP PAIN: Primary | ICD-10-CM

## 2024-05-13 PROCEDURE — 99213 OFFICE O/P EST LOW 20 MIN: CPT | Performed by: PHYSICIAN ASSISTANT

## 2024-05-13 PROCEDURE — 1160F RVW MEDS BY RX/DR IN RCRD: CPT | Performed by: PHYSICIAN ASSISTANT

## 2024-05-13 PROCEDURE — 1159F MED LIST DOCD IN RCRD: CPT | Performed by: PHYSICIAN ASSISTANT

## 2024-05-13 NOTE — PROGRESS NOTES
[facility] Note        Subjective     Pain of the Left Hip and Pain of the Right Hip      HPI    Stephan Gibbs is a 68 y.o. male. This is a very pleasant patient here to discuss his bilateral hip pain, left more painful than right.  He complains of lateral pain worse first thing in the morning and then settles down.  Pain with walking and worse at night while lying on his sides.  He denies any groin pain, radiating pain.  He has had troch bursa injections, PT and taken Tylenol with persisting pain and dysfunction.  He is here for further evaluation and treatment recommendations.     Past Medical History:   Diagnosis Date    Chronic bronchitis     Hypertension       Past Surgical History:   Procedure Laterality Date    HERNIA REPAIR      JOINT REPLACEMENT  10/07/2023    KNEE SURGERY  10/07/2024    LEG SURGERY        Family History   Problem Relation Age of Onset    Arrhythmia Mother     Emphysema Father     Hypertension Father     COPD Father     Lung disease Other     Stroke Other      Social History     Socioeconomic History    Marital status:    Tobacco Use    Smoking status: Every Day     Current packs/day: 0.50     Average packs/day: 0.7 packs/day for 93.6 years (64.7 ttl pk-yrs)     Types: Cigarettes     Start date: 6/1/1966    Smokeless tobacco: Never   Vaping Use    Vaping status: Never Used   Substance and Sexual Activity    Alcohol use: Yes     Alcohol/week: 6.0 standard drinks of alcohol     Types: 6 Cans of beer per week    Drug use: No    Sexual activity: Yes     Partners: Female      Current Outpatient Medications on File Prior to Visit   Medication Sig Dispense Refill    albuterol sulfate HFA (Ventolin HFA) 108 (90 Base) MCG/ACT inhaler Inhale 2 puffs Every 4 (Four) Hours As Needed for Wheezing or Shortness of Air. 18 g 5    allopurinol (ZYLOPRIM) 300 MG tablet Take 1 tablet by mouth Daily.      finasteride (PROSCAR) 5 MG tablet Take 1 tablet by mouth Daily.    "   gabapentin (NEURONTIN) 300 MG capsule       rosuvastatin (CRESTOR) 20 MG tablet Take  by mouth.      tamsulosin (FLOMAX) 0.4 MG capsule 24 hr capsule       telmisartan (MICARDIS) 20 MG tablet Take 0.5 tablets by mouth Daily.      tiotropium bromide-olodaterol (Stiolto Respimat) 2.5-2.5 MCG/ACT aerosol solution inhaler Inhale 2 puffs Daily. 12 g 2     No current facility-administered medications on file prior to visit.      No Known Allergies       Review of Systems   Constitutional: Negative.    HENT: Negative.     Eyes: Negative.    Respiratory: Negative.     Cardiovascular: Negative.    Gastrointestinal: Negative.    Endocrine: Negative.    Genitourinary: Negative.    Musculoskeletal: Negative.    Skin: Negative.    Allergic/Immunologic: Negative.    Neurological: Negative.    Hematological: Negative.    Psychiatric/Behavioral: Negative.          I reviewed the patient's chief complaint, history of present illness, review of systems, past medical history, surgical history, family history, social history, medications and allergy list.        Objective      Physical Exam  /88   Ht 185.4 cm (72.99\")   Wt 103 kg (227 lb 1.2 oz)   BMI 29.97 kg/m²     Body mass index is 29.97 kg/m².    General  Mental Status - alert  General Appearance - cooperative, well groomed, not in acute distress  Orientation - Oriented X3  Build & Nutrition - well developed and well nourished  Posture - normal posture  Gait - normal       Ortho Exam  Right  hip    RANGE OF MOTION:   FLEXION CONTRACTURE: None   FLEXION: 110 degrees   INTERNAL ROTATION: 20 degrees at 90 degrees of flexion   EXTERNAL ROTATION: 40 degrees at 90 degrees of flexion    PAIN WITH HIP MOTION: no  PAIN WITH LOGROLL: no  STINCHFIELD TEST: negative    STRENGTH:  5/5 hip adduction, abduction, flexion. 5/5 strength knee flexion, extension. 5/5 strength ankle dorsiflexion and plantarflexion.     GREATER TROCHANTER BURSAL PAIN:  No    SENSATION TO LIGHT TOUCH:  DEEP " PERONEAL/SUPERFICIAL PERONEAL/SURAL/SAPHENOUS/TIBIAL:  intact    STRAIGHT LEG TEST:   negative      Left hip exam:    RANGE OF MOTION:   FLEXION CONTRACTURE: None   FLEXION: 110 degrees   INTERNAL ROTATION: 20 degrees at 90 degrees of flexion   EXTERNAL ROTATION: 40 degrees at 90 degrees of flexion    PAIN WITH HIP MOTION: no  PAIN WITH LOGROLL: no  STINCHFIELD TEST: negative    STRENGTH:  5/5 hip adduction, abduction, flexion. 5/5 strength knee flexion, extension. 5/5 strength ankle dorsiflexion and plantarflexion.     GREATER TROCHANTER BURSAL PAIN:  No    SENSATION TO LIGHT TOUCH:  DEEP PERONEAL/SUPERFICIAL PERONEAL/SURAL/SAPHENOUS/TIBIAL:  intact    STRAIGHT LEG TEST:   negative        Imaging/Studies  Imaging Results (Last 24 Hours)       Procedure Component Value Units Date/Time    XR Hips Bilateral With or Without Pelvis 2 View [[id number]] Resulted: 05/13/24 1401     Updated: 05/13/24 1408              Assessment    Assessment:  1. Bilateral hip pain          Plan:  Recommend over-the-counter medication as needed for discomfort  Bilateral hip pain.  I reviewed today's x-rays, clinical findings, past and current treatment with the patient.  X-ray show no significant degenerative changes with no acute findings.  I do not think his pain is coming from his hip joints.  His history is consistent with trochanteric bursitis, however, he is not tender.  He has preserved ROM of both hip with no reproducible pain.  Given he is not tender, there is possibility that his pain is coming from his lumbar spine.  He has failed Tylenol, troch injection and physical therapy.  Recommendation today is MRI of bilateral hips for further evaluation.   I will see him back after the MRI or sooner if needed.  At f/up we will get 4 views of the right knee.  I discussed the importance of smoking cessation and the increased surgical risk and increased surgical complications with smoking. I explained the risks of smoking, including  hardening of the arteries, gangrene, amputation.  I explained smoking poisons bone cells and increases the risk of nonunion of fractures and fusions.  (3 minutes)          Mery Mckeon PA-C  05/13/24  14:41 EDT

## 2024-05-13 NOTE — TELEPHONE ENCOUNTER
I put pt. Newest labs (and only ones done this year from [facility]) in the chart. I don't think it has everything you wanted, not sure.

## 2024-05-23 RX ORDER — TIOTROPIUM BROMIDE AND OLODATEROL 3.124; 2.736 UG/1; UG/1
2 SPRAY, METERED RESPIRATORY (INHALATION) DAILY
Qty: 4 G | Refills: 0 | Status: SHIPPED | OUTPATIENT
Start: 2024-05-23

## 2024-05-30 ENCOUNTER — OFFICE VISIT (OUTPATIENT)
Age: 69
End: 2024-05-30
Payer: MEDICARE

## 2024-05-30 VITALS
WEIGHT: 227.07 LBS | HEIGHT: 73 IN | DIASTOLIC BLOOD PRESSURE: 84 MMHG | SYSTOLIC BLOOD PRESSURE: 122 MMHG | BODY MASS INDEX: 30.09 KG/M2

## 2024-05-30 DIAGNOSIS — M72.0 DUPUYTREN'S CONTRACTURE OF LEFT HAND: Primary | ICD-10-CM

## 2024-05-30 NOTE — PROGRESS NOTES
"                                                                 [facility] Orthopedic     Follow-up Office Visit       Date: 05/30/2024   Patient Name: Stephan Gibbs  MRN: [id number]  YOB: 1955    Chief Complaint:   Chief Complaint   Patient presents with    Follow-up     3 week follow up- Dupuytren's contracture of left hand        History of Present Illness:   Stephan Gibbs is a 68 y.o. male [facility] for follow-up of left hand pain and Dupuytren's contracture of the left hand.  He underwent corticosteroid injection to his left ring finger MCP of his last visit.  Reports he had no improvement in symptoms since the left ring finger injection.  Reports that he continues to have loss of flexion extension of the left hand as well as some pain.  He reports that he has significant decrease in his  strength due to loss of flexion of his ring and middle fingers.  No other concerns.      Subjective   Review of Systems:   Review of Systems   All other systems reviewed and are negative.       Pertinent review of systems per HPI    I reviewed the patient's chief complaint, history of present illness, review of systems, past medical history, surgical history, family history, social history, medications and allergy list in the EMR on 05/30/2024 and agree with the findings above.    Objective    Vital Signs:   Vitals:    05/30/24 1313   BP: 122/84   Weight: 103 kg (227 lb 1.2 oz)   Height: 185.4 cm (72.99\")     BMI: Body mass index is 29.97 kg/m².     General Appearance: No acute distress. Alert and oriented.     Chest:  Non-labored breathing on room air      Upper Extremity Exam:     Left hand with history of amputation at the PIP.  There is a central cord of the left middle finger as well as a natatory cord of the third webspace.  Patient has a flexion contracture of the middle and ring finger MCPs to approximately 60 degrees.  He is mildly tender to palpation of the ring finger MCP.  Mildly " tender to palpation of the ring finger PIP.  He has pain with passive flexion of the middle and ring fingers PIP to approximately 90 degrees.  He also has pain with passive flexion of the middle and ring finger MCPs to 90 degrees.    There is a palpable nodule at the ring finger A1 pulley is mildly tender to palpation.  No obvious triggering with flexion of the ring finger.  Able to palpate a nodule deep to the central cord of the middle finger however he is tender to palpation over the A1 pulley.  No obvious triggering with flexion.     Fingers are warm, well-perfused with appropriate capillary refill.  Palpable radial pulse.     Sensation intact to light touch in median, radial and ulnar nerve distributions.     Motor- Fires FPL, ulnar intrinsics, EPL/EDC w/ full active and passive range of motion. Strength intact.     Non-tender except for in the areas highlighted    Imaging/Studies:   Imaging Results (Last 24 Hours)       ** No results found for the last 24 hours. **                Procedures:  Procedures    Quality Measures:   ACP:   ACP discussion was deferred.    Tobacco:   Stephan Gibbs  reports that he has been smoking cigarettes. He started smoking about 58 years ago. He has a 64.7 pack-year smoking history. He has never used smokeless tobacco.    Assessment / Plan    Assessment/Plan:      Diagnosis Plan   1. Dupuytren's contracture of left hand  External Facility Surgical/Procedural Request          Patient presents for follow-up of left middle and ring finger Dupuytren's contracture.  He has evidence of significant contracture of the MCP as well as a third webspace contracture, although his primary concern is pain with  and decreased flexion of his left ring and middle finger.  There is some concern for MCP arthritis on his last visit however he had no response to corticosteroid injection to the ring finger MCP.  He does have some evidence of trigger fingers of his middle and ring fingers.   Given these findings as well as his concern for both his Dupuytren's disease as well as his ring and middle finger pain I recommend left ring and middle finger Dupuytren's fasciectomy as well as left ring and middle finger trigger release at the same time.  The patient agrees and would like to proceed with surgery.    Consent-left hand ring and middle finger Dupuytren's fasciectomy, left middle and ring finger trigger release.    The risks and benefits of the procedure were discussed with the patient and or appropriate guardian, which include but are not limited to the risk of bleeding, infection, neurovascular damage, post-operative stiffness, recurrence, tendon and/or ligament retears, recurrent instability, continued pain, arthritic pain, need for further revision surgeries in the future, deep venous thrombosis, and general risks from anesthesia. We also discussed the post-operative rehabilitation, the need for physical therapy, and the overall expected outcomes from the procedure. We also discussed the possible use of biologics including allograft. We allowed proper time and answered the patient's questions regarding the procedure. The patient expressed understanding. Knowing what the risks are and what the conservative treatment is, the patient elected to forgo any further conservative treatment options and proceed with the surgical intervention.      Follow Up:   Return for Follow Up after Post Op.        Vitaliy Becerra MD  [facility] [facility]

## 2024-06-06 ENCOUNTER — HOSPITAL ENCOUNTER (OUTPATIENT)
Facility: HOSPITAL | Age: 69
Discharge: HOME | End: 2024-06-06
Payer: MEDICARE

## 2024-06-06 DIAGNOSIS — M25.552 BILATERAL HIP PAIN: ICD-10-CM

## 2024-06-06 DIAGNOSIS — M25.551 BILATERAL HIP PAIN: ICD-10-CM

## 2024-06-06 PROCEDURE — 73721 MRI JNT OF LWR EXTRE W/O DYE: CPT

## 2024-06-07 ENCOUNTER — DOCUMENTATION (OUTPATIENT)
Dept: ORTHOPEDIC SURGERY | Facility: CLINIC | Age: 69
End: 2024-06-07

## 2024-06-07 ENCOUNTER — OUTSIDE FACILITY SERVICE (OUTPATIENT)
Dept: ORTHOPEDIC SURGERY | Facility: CLINIC | Age: 69
End: 2024-06-07
Payer: MEDICARE

## 2024-06-07 DIAGNOSIS — M72.0 DUPUYTREN'S CONTRACTURE OF LEFT HAND: Primary | ICD-10-CM

## 2024-06-07 RX ORDER — OXYCODONE HYDROCHLORIDE 5 MG/1
5 TABLET ORAL EVERY 6 HOURS PRN
Qty: 30 TABLET | Refills: 0 | Status: SHIPPED | OUTPATIENT
Start: 2024-06-07

## 2024-06-07 NOTE — PROGRESS NOTES
DATE OF PROCEDURE: 06/07/2024    LOCATION: [facility]     PROCEDURES PERFORMED:    1. Left middle finger dupuytrens fasciectomy including PIP release CPT 05347  2. Left ring finger dupuytrens fasciectomy including PIP release, each additional digit CPT 28602  3. Left middle finger trigger release CPT 72422  4. Left ring finger trigger release CPT 57529    SURGEON: Vitaliy Becerra MD      ASSISTANTS:    1. None        * Surgery not found *      ANESTHESIA: General w/ block    PREOPERATIVE DIAGNOSES:  1. Left middle finger dupuytrens contracture  2. Left ring finger dupuytrens contracture  3. Left middle finger trigger finger  4. Left ring finger trigger finger     POSTOPERATIVE DIAGNOSES:    Same     ESTIMATED BLOOD LOSS: 10 mL.    SPECIMENS: Left hand Dupuytrens disease    IMPLANTS: None    COMPLICATIONS: None     INDICATIONS:  Stephan Gibbs is a 68 y.o. male who initially presented with a history of dupuytrens disease and an associated contracture of the left ring and middle fingers at the MCP and PIP as well as a third webspace contracture due to multiple Dupuytren's cords.  He also developed pain over the A1 pulley of his ring and middle finger and difficulty with flexion of the finger consistent with underlying trigger fingers.  The patient found that the contracture interfered with activities of daily living and desired surgical correction including excision of diseased tissue as well as trigger finger release in the middle and ring fingers.  The risks, benefits, alternatives and potential complications of surgery were discussed with the patient including but not limited to bleeding scarring, infection, recurrence, stiffness, damage to surrounding structures or postoperative pain.  The patient understands the risks and agreed to proceed with surgery.  A surgical informed consent was signed prior to the procedure.     DESCRIPTION OF PROCEDURE:  The patient was greeted in the  pre-operative holding area and the surgical site was marked and consent confirmed prior to bringing the patient to the operating room. The patient received a left axillary block in the preoperative holding area. The patient was then taken to the operating room and a timeout was performed including the patient's name, procedure and antibiotic administration prior to the patient receiving general anesthesia.  The patient was positioned supine on the operative room table and a non-sterile tourniquet was applied to the left upper extremity and it was then prepped and draped in the usual sterile fashion.  The patient received the appropriate antibiotics within 1 hour of skin incision.    A narrow mariluz zig-zag incision was designed over the volar aspect of the left ring and middle fingers, with a single palmar incision and then a branch just proximal to the third webspace.  Y to V extensions were incorporated into the incision to allow lengthening of the incision after contracture release.  The extremity was exsanguinated and the tourniquet set to 250 mmHg.      The incision was made, taking care to only incise skin as to protect any superficially translated neurovascular bundles. The incision was extended just proximal and distal to the diseased fascia and the radial and ulnar neurovascular bundles identified proximally and distally for both the ring and middle fingers..     Attention was initially turned towards the ring finger.  The mariluz skin flaps were then elevated using a combination of careful sharp and blunt dissection, taking care to stay just subcutaneous while avoiding button-holes in the flaps.  After the flaps were raised and retracted, the radial and ulnar neurovascular bundles were then dissected from proximal to distal through the diseased fascia using gentle blunt dissection. Once both neurovascular bundles were completely dissected free from diseased fascia, the central cord was transected proximally  and excised in it entirety from proximal to distal.  The central cord transitioned to a spiral cord distally causing a slight contracture of the DIP.  This was also excised in its entirety.  There is also a natatory cord causing a third webspace contracture.  This too was excised, taking care to protect the digital neurovascular bundles.      Attention was then turned towards the middle finger.  The mariluz skin flaps were then elevated using a combination of careful sharp and blunt dissection, taking care to stay just subcutaneous while avoiding button-holes in the flaps.  After the flaps were raised and retracted, the radial and ulnar neurovascular bundles were then dissected from proximal to distal through the diseased fascia using gentle blunt dissection. Once both neurovascular bundles were completely dissected free from diseased fascia, the cord was excised in it entirety from proximal to distal.  All of the excised tissue was sent for pathology..     After the fasciectomy was completed, gentle manual manipulation of the MCP and PIP was performed to allow correction into full extension.  To achieve full extension of both the MCP and PIP joints.    Next the trigger release of the middle finger was performed.  The A1 pulley was identified and the base of the wound and sharply incised in its entirety.  Gliding of the tendon to the sheath was then confirmed.  Similarly the A1 pulley of the ring finger was identified and then released sharply, allowing gliding of the flexors through the tendon sheath.  This comprised the middle and ring finger trigger releases.    Next the tourniquet was let down and hemostasis achieved with bipolar electrocautery.  The wound was irrigated with copious amounts of normal saline solution and the wound closed with 4-0 nylon in a comination of horizontal mattress and simple interrupted fashion. A volar extension splint was then applied.     At the end of the procedure the patient was  awoken from anesthesia and transferred to the PACU in stable condition.  I participated in all parts of the case.      POSTOPERATIVE PLAN:  No lifting greater than 5 pounds with the operative extremity.  2.  Over the counter Tylenol and/or Advil/Aleve/Motrin for pain control. A narcotic prescription for was also provided as needed for breakthrough pain.  3.  Dressing to be removed by hand therapy POD 5 and custom splint made. Patient to begin hand therapy at that time.  4.  Follow up in 10-14 days as scheduled.

## 2024-06-12 ENCOUNTER — OFFICE VISIT (OUTPATIENT)
Dept: PULMONOLOGY | Facility: CLINIC | Age: 69
End: 2024-06-12
Payer: MEDICARE

## 2024-06-12 VITALS
WEIGHT: 228.6 LBS | RESPIRATION RATE: 18 BRPM | OXYGEN SATURATION: 95 % | HEART RATE: 103 BPM | HEIGHT: 73 IN | SYSTOLIC BLOOD PRESSURE: 122 MMHG | BODY MASS INDEX: 30.3 KG/M2 | DIASTOLIC BLOOD PRESSURE: 74 MMHG

## 2024-06-12 DIAGNOSIS — Z14.8 ALPHA-1-ANTITRYPSIN DEFICIENCY CARRIER: ICD-10-CM

## 2024-06-12 DIAGNOSIS — F17.210 NICOTINE DEPENDENCE, CIGARETTES, UNCOMPLICATED: ICD-10-CM

## 2024-06-12 DIAGNOSIS — R91.8 LUNG NODULE, MULTIPLE: ICD-10-CM

## 2024-06-12 DIAGNOSIS — R93.89 ABNORMAL CT OF THE CHEST: ICD-10-CM

## 2024-06-12 DIAGNOSIS — J44.9 CHRONIC OBSTRUCTIVE PULMONARY DISEASE, UNSPECIFIED COPD TYPE: Primary | ICD-10-CM

## 2024-06-12 PROCEDURE — 99214 OFFICE O/P EST MOD 30 MIN: CPT | Performed by: INTERNAL MEDICINE

## 2024-06-12 RX ORDER — TIOTROPIUM BROMIDE AND OLODATEROL 3.124; 2.736 UG/1; UG/1
2 SPRAY, METERED RESPIRATORY (INHALATION) DAILY
Qty: 4 G | Refills: 11 | Status: SHIPPED | OUTPATIENT
Start: 2024-06-12

## 2024-06-12 RX ORDER — ALBUTEROL SULFATE 90 UG/1
2 AEROSOL, METERED RESPIRATORY (INHALATION) EVERY 4 HOURS PRN
Qty: 18 G | Refills: 5 | Status: SHIPPED | OUTPATIENT
Start: 2024-06-12

## 2024-06-12 NOTE — PROGRESS NOTES
"  Chief Complaint   Patient presents with    Shortness of Breath         Subjective   Stephan Gibbs is a 68 y.o. male.   Patient was evaluated today for follow up of shortness of breath, and COPD.     Patient says that his symptoms have been stable since the last clinic visit. he reports no recent exacerbations.      Patient is using Stiolto, as prescribed. Exercise tolerance has also remained stable     Continues to smoke 1 pack per day.    He is also here to follow up on lung nodules.       The following portions of the patient's history were reviewed and updated as appropriate: allergies, current medications, past family history, past medical history, past social history, and past surgical history.    Review of Systems   HENT:  Negative for sinus pressure, sneezing and sore throat.    Respiratory:  Negative for cough, chest tightness, shortness of breath and wheezing.    Psychiatric/Behavioral:  Negative for sleep disturbance.        Objective   Visit Vitals  /74   Pulse 103   Resp 18   Ht 185.4 cm (72.99\") Comment: pt reported   Wt 104 kg (228 lb 9.6 oz)   SpO2 95%   BMI 30.17 kg/m²         BMI Readings from Last 3 Encounters:   06/12/24 30.17 kg/m²   05/30/24 29.97 kg/m²   06/06/24 30.34 kg/m²       Physical Exam  Vitals reviewed.   Constitutional:       Appearance: He is well-developed.   HENT:      Head: Normocephalic and atraumatic.   Eyes:      Extraocular Movements: Extraocular movements intact.   Cardiovascular:      Rate and Rhythm: Normal rate.   Pulmonary:      Comments: Somewhat hyperresonant to percussion.  Somewhat decreased air entry.  No obvious wheezing noted.   Musculoskeletal:      Cervical back: Neck supple.   Neurological:      Mental Status: He is alert.         Assessment & Plan   Diagnoses and all orders for this visit:    1. Chronic obstructive pulmonary disease, unspecified COPD type (Primary)  -     Complete PFT - Pre & Post Bronchodilator; Future    2. Nicotine dependence, " cigarettes, uncomplicated  -      CT Chest Low Dose Cancer Screening WO; Future    3. Abnormal CT of the chest    4. Lung nodule, multiple    5. Alpha-1-antitrypsin deficiency carrier    Other orders  -     albuterol sulfate HFA (Ventolin HFA) 108 (90 Base) MCG/ACT inhaler; Inhale 2 puffs Every 4 (Four) Hours As Needed for Wheezing or Shortness of Air.  Dispense: 18 g; Refill: 5  -     tiotropium bromide-olodaterol (Stiolto Respimat) 2.5-2.5 MCG/ACT aerosol solution inhaler; Inhale 2 puffs Daily.  Dispense: 4 g; Refill: 11           Return in about 40 weeks (around 3/19/2025) for Recheck, PFT F/U, Imaging, For [facility] ([place]).    DISCUSSION (if any):  Last CT scan results was reviewed in great detail with the patient.  Results for orders placed during the hospital encounter of 10/06/23    CT Chest Low Dose Cancer Screening WO    Narrative  PROCEDURE: CT CHEST LOW DOSE CANCER SCREENING WO-    HISTORY: Lung cancer screening, >= 20 pk-yr smoking history (Age >=  50y); F17.210-Nicotine dependence, cigarettes, uncomplicated    TECHNIQUE: Axial images were obtained from the thoracic inlet through  the upper abdomen per the low-dose protocol. Coronal images were  reformatted. DLP 42.47 mGy.cm ; CTDI 0.98 mGy.    COMPARISON: September 30, 2022.    FINDINGS: There is centrilobular emphysema. Vascular calcifications  noted. There is evidence of previous calcified granulomatous disease.  There are 8 mm or less bilateral noncalcified pulmonary nodules which  are stable. There is no airspace disease. The heart is normal in size.  The aorta is normal in caliber. There are no pleural or pericardial  effusions. There is partial visualization of an exophytic, hypodense  lesion arising posteriorly from the right kidney consistent with a cyst.  No adrenal mass identified. Hypodense lesions in the periphery of the  right lobe and centrally in the left lobe appear stable and likely  represent cysts.. Bone windows reveal no acute  osseous abnormalities.    Impression  Lung RADS category 2: Benign.    RECOMMENDATIONS:Annual low-dose chest CT.    This report was signed and finalized on 10/9/2023 8:55 AM by Gabbie Pandya MD.      Laboratory data was reviewed with him.   Lab Results   Component Value Date    AFPTM 82 (L) 06/13/2017     Lab Results   Component Value Date    PHENOTYPE MZ 06/13/2017     he is aware of alpha 1 antitrypsin carrier state and the need to possibly discuss this with his children and siblings. The patient's own levels are not significantly abnormal at this time.     Latest available PFTs were reviewed.  Consistent with moderate obstruction. Performed in May 2023.    PFTs will be ordered to be done upon follow up.    We have reviewed his pulmonary medications in great detail.    Compliance with medications stressed.     Side effects of prescribed medications discussed with the patient    Patient was strongly encouraged to quit smoking as soon as possible.    The patient belongs to the risk group for which lung cancer screening has been recommended.  This will be indicated in Oct 2024. This has been ordered.    his latest CT showed stable nodules.    Will schedule LDCT from now on.     Vaccination status addressed.    Declines influenza vaccinations.     Up-to-date pneumonia vaccinations.     For the vaccines, that he refused today, I have asked him to discuss this further with his PCP.    Dictated utilizing Dragon dictation.    This document was electronically signed by Chante Corey MD on 06/12/24 at 14:29 EDT

## 2024-06-17 ENCOUNTER — OFFICE VISIT (OUTPATIENT)
Age: 69
End: 2024-06-17
Payer: MEDICARE

## 2024-06-17 VITALS — TEMPERATURE: 98.6 F

## 2024-06-17 DIAGNOSIS — Z98.890 POST-OPERATIVE STATE: Primary | ICD-10-CM

## 2024-06-17 PROCEDURE — 1159F MED LIST DOCD IN RCRD: CPT | Performed by: PLASTIC SURGERY

## 2024-06-17 PROCEDURE — 1160F RVW MEDS BY RX/DR IN RCRD: CPT | Performed by: PLASTIC SURGERY

## 2024-06-17 PROCEDURE — 99024 POSTOP FOLLOW-UP VISIT: CPT | Performed by: PLASTIC SURGERY

## 2024-06-17 NOTE — PROGRESS NOTES
[facility]     Follow-up Office Visit       Date: 06/17/2024   Patient Name: Stephan Gibbs  MRN: [id number]  YOB: 1955    Chief Complaint:   Chief Complaint   Patient presents with    Post-op      2 week s/p Left middle finger dupuytrens fasciectomy including PIP release;  Left ring finger dupuytrens fasciectomy including PIP release, each additional digit; Left middle finger trigger release; Left ring finger trigger release 6/7/24       History of Present Illness:   Stephan Gibbs is a 68 y.o. male presents status post left middle finger and ring finger Dupuytren fasciectomy on 6/7/2024.  Has been doing well since surgery.  Been working with hand therapy and has been wearing a custom splint.  Denies numbness and tingling.  He is otherwise doing well.  Reports pain is well-controlled.      Subjective   Review of Systems:   Review of Systems   Constitutional:  Negative for chills, fever, unexpected weight gain and unexpected weight loss.   HENT:  Negative for congestion, postnasal drip and rhinorrhea.    Eyes:  Negative for blurred vision.   Respiratory:  Negative for shortness of breath.    Cardiovascular:  Negative for leg swelling.   Gastrointestinal:  Negative for abdominal pain, nausea and vomiting.   Genitourinary:  Negative for difficulty urinating.   Musculoskeletal:  Positive for arthralgias. Negative for gait problem, joint swelling and myalgias.   Skin:  Negative for skin lesions and wound.   Neurological:  Negative for dizziness, weakness, light-headedness and numbness.   Hematological:  Does not bruise/bleed easily.   Psychiatric/Behavioral:  Negative for depressed mood.         Pertinent review of systems per HPI    I reviewed the patient's chief complaint, history of present illness, review of systems, past medical history, surgical history, family history, social history, medications and allergy  list in the EMR on 06/17/2024 and agree with the findings above.    Objective    Vital Signs:   Vitals:    06/17/24 1058   Temp: 98.6 °F (37 °C)     BMI: There is no height or weight on file to calculate BMI.     General Appearance: No acute distress. Alert and oriented.     Chest:  Non-labored breathing on room air      Ortho Exam:  Left hand incisions clean dry intact.  There is some duskiness over the webspace based skin flap however skin flaps warm well-perfused.  Patient has full active and passive extension of the hand.  Patient continues to have limited active flexion particularly of the ring finger due to stiffness and swelling.  Sensation is intact to light touch of the ring and middle fingers.  Fingers warm and well-perfused distally  Sensation intact to light touch in the median, radial and ulnar nerve distributions    Imaging/Studies:   Imaging Results (Last 24 Hours)       ** No results found for the last 24 hours. **              Procedures:  Procedures    Quality Measures:   ACP:   ACP discussion was deferred.    Tobacco:   Stephan Gibbs  reports that he has been smoking cigarettes. He started smoking about 58 years ago. He has a 64.7 pack-year smoking history. He has never used smokeless tobacco.    Assessment / Plan    Assessment/Plan:      Diagnosis Plan   1. Post-operative state            Patient presents for 10-day follow-up status post left hand Dupuytren's fasciectomy.  He is doing well postoperatively and his pain is well-controlled.  He is working with hand therapy to gradually increase active and passive flexion of his left hand.  Have his sutures removed today.  Recommend continue hand therapy, continue extension brace when not doing hand therapy, recommend observation of potential webspace flap necrosis.  Recommend follow-up with me in 2 weeks for removal of the remainder of the sutures.    Follow Up:   Return in about 2 weeks (around 7/1/2024).        Vitaliy Becerra,  MD [facility] Upper Ext[facility] Surgeon

## 2024-06-28 ENCOUNTER — TELEPHONE (OUTPATIENT)
Age: 69
End: 2024-06-28
Payer: MEDICARE

## 2024-06-28 NOTE — TELEPHONE ENCOUNTER
Called to reschedule the 7/1/24 appointment. Dr Becerra will be out of the office.    HUB ok to resc.

## 2024-07-15 ENCOUNTER — OFFICE VISIT (OUTPATIENT)
Age: 69
End: 2024-07-15
Payer: MEDICARE

## 2024-07-15 DIAGNOSIS — Z98.890 POST-OPERATIVE STATE: Primary | ICD-10-CM

## 2024-07-15 PROCEDURE — 99024 POSTOP FOLLOW-UP VISIT: CPT | Performed by: PLASTIC SURGERY

## 2024-07-15 PROCEDURE — 1159F MED LIST DOCD IN RCRD: CPT | Performed by: PLASTIC SURGERY

## 2024-07-15 PROCEDURE — 1160F RVW MEDS BY RX/DR IN RCRD: CPT | Performed by: PLASTIC SURGERY

## 2024-07-15 NOTE — PROGRESS NOTES
[facility]     Follow-up Office Visit       Date: 07/15/2024   Patient Name: Stephan Gibbs  MRN: [id number]  YOB: 1955    Chief Complaint:   Chief Complaint   Patient presents with    Post-op     4 week recheck - s/p Left middle finger dupuytrens fasciectomy including PIP release;  Left ring finger dupuytrens fasciectomy including PIP release, each additional digit; Left middle finger trigger release; Left ring finger trigger release 6/7/24            History of Present Illness:   Stephan Gibbs is a 68 y.o. male presents 6 weeks status post left middle and ring finger Dupuytren's fasciectomy as well as left ring finger trigger release on 6/7/2024.  Has been doing well since last visit.  He i has been working with hand therapy.  Significant improvement in use of his index middle finger however he does still has difficulty flexion of the ring finger.   No other concerns.      Subjective   Review of Systems:   Review of Systems   Constitutional:  Negative for chills, fever, unexpected weight gain and unexpected weight loss.   HENT:  Negative for congestion, postnasal drip and rhinorrhea.    Eyes:  Negative for blurred vision.   Respiratory:  Negative for shortness of breath.    Cardiovascular:  Negative for leg swelling.   Gastrointestinal:  Negative for abdominal pain, nausea and vomiting.   Genitourinary:  Negative for difficulty urinating.   Musculoskeletal:  Positive for arthralgias. Negative for gait problem, joint swelling and myalgias.   Skin:  Negative for skin lesions and wound.   Neurological:  Negative for dizziness, weakness, light-headedness and numbness.   Hematological:  Does not bruise/bleed easily.   Psychiatric/Behavioral:  Negative for depressed mood.    All other systems reviewed and are negative.       Pertinent review of systems per HPI    I reviewed the patient's chief complaint, history of  present illness, review of systems, past medical history, surgical history, family history, social history, medications and allergy list in the EMR on 07/15/2024 and agree with the findings above.    Objective    Vital Signs: There were no vitals filed for this visit.  BMI: There is no height or weight on file to calculate BMI.     General Appearance: No acute distress. Alert and oriented.     Chest:  Non-labored breathing on room air      Ortho Exam:  There is a small wound at the base of his left middle finger with healthy granulation tissue.  Otherwise all incisions are well-healed.  Patient has near full flexion extension of the index and flexion of the ring finger  middle fingers.  Patient is able to fully extend the ring finger.  Patient has limited flexion at the ring finger at the PIP with some intrinsic tightness as well as capsular tightness.  Fingers warm and well-perfused distally  Sensation intact to light touch in the median, radial and ulnar nerve distributions    Imaging/Studies:   Imaging Results (Last 24 Hours)       ** No results found for the last 24 hours. **                Procedures:  Procedures    Quality Measures:   ACP:   ACP discussion was deferred.    Tobacco:   Stephan Gibbs  reports that he has been smoking cigarettes. He started smoking about 58 years ago. He has a 64.8 pack-year smoking history. He has never used smokeless tobacco.    Assessment / Plan    Assessment/Plan:      Diagnosis Plan   1. Post-operative state            6 weeks status post left hand Dupuytren's contracture release of the ring and middle finger.  He has persistent stiffness of the left ring finger more due to intrinsic tightness and some PIP tightness and his underlying Dupuytren's disease.  Otherwise he is doing well and has significantly improved use of his left hand overall.  Recommend continue hand therapy with particular focus on the ring finger PIP joint as well as the intrinsic tightness of the  ring finger.  Recommend bacitracin twice daily to the small hand wound.  Recommend follow-up with me in 6 to 8 weeks for repeat check.    Follow Up:   Return in about 2 months (around 9/15/2024).        Vitaliy Becerra MD  [facility] [place] Surgeon

## 2024-07-19 ENCOUNTER — OFFICE VISIT (OUTPATIENT)
Age: 69
End: 2024-07-19
Payer: MEDICARE

## 2024-07-19 DIAGNOSIS — M16.0 PRIMARY OSTEOARTHRITIS OF BOTH HIPS: ICD-10-CM

## 2024-07-19 DIAGNOSIS — M25.552 LEFT HIP PAIN: Primary | ICD-10-CM

## 2024-07-19 DIAGNOSIS — M25.551 RIGHT HIP PAIN: ICD-10-CM

## 2024-07-19 RX ADMIN — TRIAMCINOLONE ACETONIDE 80 MG: 40 INJECTION, SUSPENSION INTRA-ARTICULAR; INTRAMUSCULAR at 08:19

## 2024-07-19 RX ADMIN — BUPIVACAINE HYDROCHLORIDE 2 ML: 5 INJECTION, SOLUTION EPIDURAL; INTRACAUDAL at 08:19

## 2024-07-19 RX ADMIN — LIDOCAINE HYDROCHLORIDE 2 ML: 10 INJECTION, SOLUTION EPIDURAL; INFILTRATION; INTRACAUDAL; PERINEURAL at 08:19

## 2024-07-22 VITALS
BODY MASS INDEX: 30.39 KG/M2 | SYSTOLIC BLOOD PRESSURE: 132 MMHG | WEIGHT: 229.28 LBS | DIASTOLIC BLOOD PRESSURE: 81 MMHG | HEIGHT: 73 IN

## 2024-07-22 NOTE — PROGRESS NOTES
"        [facility]        Subjective     CC: Follow-up (2 month MRI (6/6/24) recheck - Bilateral hip pain)      KATIE Gibbs is a 68 y.o. male. Patient returns today for f/up bilateral hip MRI.  He reports no change in his symptoms.  He continues to have both lateral and groin pain.  He has failed troch bursa injection, PT.  He reports some radiating pain down his legs.  No new symptoms.    Overall, patient's symptoms are the same    ROS:    Constiutional:Pt denies fever, chills, nausea, or vomiting.  MSK:as above        Objective      Past Medical History  Past Medical History:   Diagnosis Date    Chronic bronchitis     Fracture, fibula 11/23/1974    Fracture, tibia and fibula 11/23/974    Hypertension          Physical Exam  /81   Ht 185.4 cm (72.99\")   Wt 104 kg (229 lb 4.5 oz)   BMI 30.26 kg/m²     Body mass index is 30.26 kg/m².    Patient is well nourished and well developed.        Ortho Exam  Bilateral hip exam:  Groin pain with IR of bilateral hips with mild stiffness.  Positive Stinchfield bilaterally.  Midlly tender over bilateral greater trochanters.  NVI distally.    Imaging/Labs/EMG Reviewed:  Imaging Results (Last 24 Hours)       Procedure Component Value Units Date/Time    US Guided Injection [[id number]] Resulted: 07/22/24 1058     Updated: 07/22/24 1059    US Guided Injection [[id number]] Resulted: 07/22/24 1058     Updated: 07/22/24 1058              Assessment    Assessment:  1. Left hip pain    2. Right hip pain    3. Primary osteoarthritis of both hips        Plan:  Recommend over the counter anti-inflammatories for pain and/or swelling  Bilateral hip arthritis.   I reviewed MRIs of bilateral hip from 6/6/24, clinical findings with the patient.  MRI shows moderate arthritis in bilateral hip joints with degenerative labral tearing, no acute findings.  We discussed treatment for hip arthritis including intra articular injection, oral medication, " cane use and eventually hip replacement with persisting functional and night pain with failed conservative treatment.  Recommendation today is ultrasound guided bilateral intra-articular cortisone injection.  This will be both diagnostic and therapeutic.  I will see him back in 2 months for repeat exam and see how he has progressed, sooner if needed.    History, diagnosis and treatment plan discussed with Dr. Bill.         Mery Mckeon PA-C  07/23/24  10:17 EDT    Procedure   - Large Joint Arthrocentesis: bilateral hip joint on 7/19/2024 8:19 AM  Indications: pain  Details: 21 G needle, ultrasound-guided anterior approach  Medications (Right): 80 mg triamcinolone acetonide 40 MG/ML; 2 mL lidocaine PF 1% 1 %; 2 mL bupivacaine (PF) 0.5 %  Medications (Left): 80 mg triamcinolone acetonide 40 MG/ML; 2 mL lidocaine PF 1% 1 %; 2 mL bupivacaine (PF) 0.5 %  Outcome: tolerated well, no immediate complications  Procedure, treatment alternatives, risks and benefits explained, specific risks discussed. Consent was given by the patient. Immediately prior to procedure a time out was called to verify the correct patient, procedure, equipment, support staff and site/side marked as required. Patient was prepped and draped in the usual sterile fashion.          68-year-old female presents with bilateral hip pain from bilatearl hip osteoarthritis.  Patient is a referral from [facility] for ultrasound-guided bilateral hip joint corticosteroid injection.  Previous office documentation and images were personally reviewed prior to the visit.  We discussed them in detail how they correlate to experienced symptoms.  I explained the procedure in detail.  I answered all questions to the best my ability.  Risks and benefits as well as post procedure instructions were provided.  Patient elected to proceed and tolerated this procedure well.  See procedure note.  Follow-up with me will be on an as-needed basis.

## 2024-07-23 RX ORDER — BUPIVACAINE HYDROCHLORIDE 5 MG/ML
2 INJECTION, SOLUTION EPIDURAL; INTRACAUDAL
Status: COMPLETED | OUTPATIENT
Start: 2024-07-19 | End: 2024-07-19

## 2024-07-23 RX ORDER — TRIAMCINOLONE ACETONIDE 40 MG/ML
80 INJECTION, SUSPENSION INTRA-ARTICULAR; INTRAMUSCULAR
Status: COMPLETED | OUTPATIENT
Start: 2024-07-19 | End: 2024-07-19

## 2024-07-23 RX ORDER — LIDOCAINE HYDROCHLORIDE 10 MG/ML
2 INJECTION, SOLUTION EPIDURAL; INFILTRATION; INTRACAUDAL; PERINEURAL
Status: COMPLETED | OUTPATIENT
Start: 2024-07-19 | End: 2024-07-19

## 2024-09-26 ENCOUNTER — OFFICE VISIT (OUTPATIENT)
Age: 69
End: 2024-09-26
Payer: COMMERCIAL

## 2024-09-26 VITALS
WEIGHT: 234.3 LBS | DIASTOLIC BLOOD PRESSURE: 84 MMHG | HEIGHT: 73 IN | BODY MASS INDEX: 31.05 KG/M2 | SYSTOLIC BLOOD PRESSURE: 146 MMHG

## 2024-09-26 DIAGNOSIS — Z98.890 POST-OPERATIVE STATE: Primary | ICD-10-CM

## 2024-09-26 RX ORDER — LIDOCAINE HYDROCHLORIDE 10 MG/ML
0.5 INJECTION, SOLUTION EPIDURAL; INFILTRATION; INTRACAUDAL; PERINEURAL
Status: COMPLETED | OUTPATIENT
Start: 2024-09-26 | End: 2024-09-26

## 2024-09-26 RX ORDER — TRIAMCINOLONE ACETONIDE 40 MG/ML
20 INJECTION, SUSPENSION INTRA-ARTICULAR; INTRAMUSCULAR
Status: COMPLETED | OUTPATIENT
Start: 2024-09-26 | End: 2024-09-26

## 2024-09-26 RX ADMIN — TRIAMCINOLONE ACETONIDE 20 MG: 40 INJECTION, SUSPENSION INTRA-ARTICULAR; INTRAMUSCULAR at 15:43

## 2024-09-26 RX ADMIN — LIDOCAINE HYDROCHLORIDE 0.5 ML: 10 INJECTION, SOLUTION EPIDURAL; INFILTRATION; INTRACAUDAL; PERINEURAL at 15:43

## 2024-10-09 ENCOUNTER — OFFICE VISIT (OUTPATIENT)
Dept: UROLOGY | Facility: CLINIC | Age: 69
End: 2024-10-09
Payer: MEDICARE

## 2024-10-09 VITALS
OXYGEN SATURATION: 94 % | WEIGHT: 233 LBS | TEMPERATURE: 97.6 F | RESPIRATION RATE: 15 BRPM | BODY MASS INDEX: 30.88 KG/M2 | HEART RATE: 96 BPM | HEIGHT: 73 IN

## 2024-10-09 DIAGNOSIS — R39.12 BENIGN PROSTATIC HYPERPLASIA WITH WEAK URINARY STREAM: Primary | ICD-10-CM

## 2024-10-09 DIAGNOSIS — N40.1 BENIGN PROSTATIC HYPERPLASIA WITH WEAK URINARY STREAM: Primary | ICD-10-CM

## 2024-10-09 LAB
BILIRUB BLD-MCNC: NEGATIVE MG/DL
CLARITY, POC: CLEAR
COLOR UR: ABNORMAL
EXPIRATION DATE: ABNORMAL
GLUCOSE UR STRIP-MCNC: NEGATIVE MG/DL
KETONES UR QL: NEGATIVE
LEUKOCYTE EST, POC: NEGATIVE
Lab: ABNORMAL
NITRITE UR-MCNC: NEGATIVE MG/ML
PH UR: 6.5 [PH] (ref 5–8)
PROT UR STRIP-MCNC: NEGATIVE MG/DL
RBC # UR STRIP: ABNORMAL /UL
SP GR UR: 1.02 (ref 1–1.03)
UROBILINOGEN UR QL: NORMAL

## 2024-10-09 NOTE — PROGRESS NOTES
Office Visit [facility]      Patient Name: Stephan Gibbs  : 1955   MRN: [id number]     Chief Complaint:   Chief Complaint   Patient presents with    Benign Prostatic Hypertrophy    Establish Care       Referring Provider: Bryce Parkinson*    History of Present Illness: Stephan Gibbs is a 69 y.o. male who presents today with history of BPH on Finasteride and Flomax for the past 4-5 years. Symptoms started worsening over the past 2 months.  Does not feel like symptoms are controlled with medications.   Primary symptom includes: incomplete emptying, frequency, intermittency, urgency, weak stream, straining and nocturia x 1.   Patient denies hematuria, dysuria   Onset was 4-5 years ago.  Patient desires conception in the Future: no  Previous treatments include: Finasteride and Flomax     PSA 0.982 ng/mL adjusted for Finasteride: 1.94 ng/mL     IPSS Questionnaire (AUA-7):  Incomplete emptying  Over the past month, how often have you had a sensation of not emptying your bladder completely after you finished urinating?: Less than half the time (10/09/24 1541)  Frequency  Over the past month, how often have you had to urinate again less than two hours after you finishied urinating ?: Less than half the time (10/09/24 1541)  Intermittency  Over the past month, how often have you found you stopped and started again several time when you urinated ?: Less than 1 time in 5 (10/09/24 1541)  Urgency  Over the last month, how often have you found it difficult  have you found it difficult to postpone urination ?: Less than half the time (10/09/24 1541)  Weak Stream  Over the past month, how often have you had a weak urinary stream ?: About half the time (10/09/24 1541)  Straining  Over the past month, how often have you had to push or strain to begin urination ?: Less than 1 time in 5 (10/09/24 1541)  Nocturia  Over the past month, how many times did you most typically get up to urinate from the  time you went to bed until the time you got up in the morning ?: 1 time (10/09/24 1541)  Quality of life due to urinary symptoms  If you were to spend the rest of your life with your urinary condition the way it is now, how would feel about that?: Unhappy (10/09/24 1541)  Scores  Total IPSS Score: (!) 12 (10/09/24 1541)  Total Score = Symptomatic Level: Moderately symptomatic: 8-19 (10/09/24 1541)   Subjective      Review of System: ROS reviewed by me and is negative unless otherwise noted in HPI.      Past Medical History:   Past Medical History:   Diagnosis Date    Chronic bronchitis     Chronic kidney disease 10/10/23    COPD (chronic obstructive pulmonary disease) 2019    Fracture, fibula 11/23/1974    Fracture, tibia and fibula 11/23/974    Hypertension      Past Surgical History:   Past Surgical History:   Procedure Laterality Date    HAND SURGERY  06/07/2024    Left middle finger dupuytrens fasciectomy including PIP release;  Left ring finger dupuytrens fasciectomy including PIP release, each additional digit; Left middle finger trigger release; Left ring finger trigger release Dr. Vitaliy Becerra    HERNIA REPAIR      JOINT REPLACEMENT  10/07/2023    KNEE SURGERY  10/07/2023    LEG SURGERY       Family History:   Family History   Problem Relation Age of Onset    Emphysema Father     Hypertension Father     COPD Father     Arrhythmia Mother     Lung disease Other     Stroke Other     Prostate cancer Neg Hx     Kidney cancer Neg Hx      Social History:   Social History     Socioeconomic History    Marital status:    Tobacco Use    Smoking status: Every Day     Current packs/day: 1.50     Average packs/day: 1.5 packs/day for 57.4 years (86.0 ttl pk-yrs)     Types: Cigarettes     Start date: 6/1/1967     Passive exposure: Past    Smokeless tobacco: Never   Vaping Use    Vaping status: Never Used   Substance and Sexual Activity    Alcohol use: Yes     Alcohol/week: 3.0 - 6.0 standard drinks of alcohol      "Types: 3 - 6 Cans of beer per week    Drug use: No    Sexual activity: Yes     Partners: Female     Birth control/protection: None       Medications:     Current Outpatient Medications:     allopurinol (ZYLOPRIM) 300 MG tablet, Take 1 tablet by mouth Daily., Disp: , Rfl:     finasteride (PROSCAR) 5 MG tablet, Take 1 tablet by mouth Daily., Disp: , Rfl:     gabapentin (NEURONTIN) 300 MG capsule, 1 capsule 4 (Four) Times a Day., Disp: , Rfl:     rosuvastatin (CRESTOR) 20 MG tablet, Take  by mouth., Disp: , Rfl:     tamsulosin (FLOMAX) 0.4 MG capsule 24 hr capsule, , Disp: , Rfl:     telmisartan (MICARDIS) 20 MG tablet, Take 0.5 tablets by mouth Daily., Disp: , Rfl:     tiotropium bromide-olodaterol (Stiolto Respimat) 2.5-2.5 MCG/ACT aerosol solution inhaler, Inhale 2 puffs Daily., Disp: 4 g, Rfl: 11    albuterol sulfate HFA (Ventolin HFA) 108 (90 Base) MCG/ACT inhaler, Inhale 2 puffs Every 4 (Four) Hours As Needed for Wheezing or Shortness of Air. (Patient not taking: Reported on 10/9/2024), Disp: 18 g, Rfl: 5    Allergies:   No Known Allergies    Objective     Physical Exam:   Vital Signs:   Vitals:    10/09/24 1514   Pulse: 96   Resp: 15   Temp: 97.6 °F (36.4 °C)   TempSrc: Temporal   SpO2: 94%   Weight: 106 kg (233 lb)   Height: 185.5 cm (73.03\")     Body mass index is 30.71 kg/m².   Physical Exam  Vitals and nursing note reviewed.   Constitutional:       General: He is not in acute distress.     Appearance: Normal appearance. He is not ill-appearing.   HENT:      Head: Normocephalic and atraumatic.   Pulmonary:      Effort: Pulmonary effort is normal.   Skin:     General: Skin is warm and dry.   Neurological:      General: No focal deficit present.      Mental Status: He is alert and oriented to person, place, and time.   Psychiatric:         Mood and Affect: Mood normal.         Behavior: Behavior normal.    Labs  No results found for: \"PSA\"    Brief Urine Lab Results       None          PVR  Post-void residual " performed by staff - 0 ml     Assessment / Plan      Assessment  Stephan Gibbs is a 69 y.o. male who presents today with history of BPH on Finasteride and Flomax for the past 4-5 years. Symptoms started worsening over the past 2 months.  Does not feel like symptoms are controlled with medications. PSA is 0 ml.  UA is benign. PSA adjusted for Finasteride is 1.94    We discussed that my recommendation is for him to return for a BPH work up with Dr. Chisholm which includes:   Cystoscopy: Dr. Chisholm will take a small flexible cystoscope into the urethra and into the bladder.  This will show if there is any bladder damage and if your prostate is obstructing your urine flow.    TRUS (transrectal ultrasound): small rectal ultrasound probe is placed into the rectum.  This allows Dr. Chisholm to measure the size of the prostate.     Uroflow: you will urinate into a funnel and we test your urine stream strength.      He was agreeable to this plan today and wishes to proceed.      Diagnoses and all orders for this visit:    1. Benign prostatic hyperplasia with weak urinary stream (Primary)  -     POC Urinalysis Dipstick, Automated    Follow Up:   Return for With Dr. Chisholm for BPH workup. .    Nallely Castaneda, ALEXIA, MSN, FNP-C  [facility] [place] [facility]

## 2024-10-11 ENCOUNTER — PATIENT ROUNDING (BHMG ONLY) (OUTPATIENT)
Dept: UROLOGY | Facility: CLINIC | Age: 69
End: 2024-10-11
Payer: MEDICARE

## 2024-10-11 NOTE — PROGRESS NOTES
October 11, 2024    Hello, may I speak with Stephan Gibbs? No answer.    My name is Angelina.      I am  with [facility]  [address]  [address]  [facility] [address] [phone].    Before we get started may I verify your date of birth? 1955    I am calling to officially welcome you to our practice and ask about your recent visit. Is this a good time to talk?     Tell me about your visit with us. What things went well?         We're always looking for ways to make our patients' experiences even better. Do you have recommendations on ways we may improve?      Overall were you satisfied with your first visit to our practice?        I appreciate you taking the time to speak with me today. Is there anything else I can do for you?       Thank you, and have a great day.

## 2024-10-16 ENCOUNTER — HOSPITAL ENCOUNTER (OUTPATIENT)
Dept: CT IMAGING | Facility: HOSPITAL | Age: 69
Discharge: HOME | End: 2024-10-16
Admitting: INTERNAL MEDICINE
Payer: MEDICARE

## 2024-10-16 DIAGNOSIS — F17.210 NICOTINE DEPENDENCE, CIGARETTES, UNCOMPLICATED: ICD-10-CM

## 2024-10-16 PROCEDURE — 71271 CT THORAX LUNG CANCER SCR C-: CPT

## 2025-01-02 ENCOUNTER — PROCEDURE VISIT (OUTPATIENT)
Dept: UROLOGY | Facility: CLINIC | Age: 70
End: 2025-01-02
Payer: MEDICARE

## 2025-01-02 DIAGNOSIS — R39.12 BENIGN PROSTATIC HYPERPLASIA WITH WEAK URINARY STREAM: Primary | ICD-10-CM

## 2025-01-02 DIAGNOSIS — N40.1 BENIGN PROSTATIC HYPERPLASIA WITH WEAK URINARY STREAM: Primary | ICD-10-CM

## 2025-01-02 NOTE — PROGRESS NOTES
CC  LUTS / BPH Workup    HPI  Ms. Gibbs is a 69 y.o. male with history below in assessment, who presents for follow up.     At this visit patient is here for BPH workup and discussion.     Past Medical History:   Diagnosis Date    Chronic bronchitis     Chronic kidney disease 10/10/23    COPD (chronic obstructive pulmonary disease) 2019    Fracture, fibula 11/23/1974    Fracture, tibia and fibula 11/23/974    Hypertension        Past Surgical History:   Procedure Laterality Date    HAND SURGERY  06/07/2024    Left middle finger dupuytrens fasciectomy including PIP release;  Left ring finger dupuytrens fasciectomy including PIP release, each additional digit; Left middle finger trigger release; Left ring finger trigger release Dr. Vitaliy Becerra    HERNIA REPAIR      JOINT REPLACEMENT  10/07/2023    KNEE SURGERY  10/07/2023    LEG SURGERY           Current Outpatient Medications:     albuterol sulfate HFA (Ventolin HFA) 108 (90 Base) MCG/ACT inhaler, Inhale 2 puffs Every 4 (Four) Hours As Needed for Wheezing or Shortness of Air. (Patient not taking: Reported on 10/9/2024), Disp: 18 g, Rfl: 5    allopurinol (ZYLOPRIM) 300 MG tablet, Take 1 tablet by mouth Daily., Disp: , Rfl:     finasteride (PROSCAR) 5 MG tablet, Take 1 tablet by mouth Daily., Disp: , Rfl:     gabapentin (NEURONTIN) 300 MG capsule, 1 capsule 4 (Four) Times a Day., Disp: , Rfl:     rosuvastatin (CRESTOR) 20 MG tablet, Take  by mouth., Disp: , Rfl:     tamsulosin (FLOMAX) 0.4 MG capsule 24 hr capsule, , Disp: , Rfl:     telmisartan (MICARDIS) 20 MG tablet, Take 0.5 tablets by mouth Daily., Disp: , Rfl:     tiotropium bromide-olodaterol (Stiolto Respimat) 2.5-2.5 MCG/ACT aerosol solution inhaler, Inhale 2 puffs Daily., Disp: 4 g, Rfl: 11     Physical Exam  There were no vitals taken for this visit.    Labs  Brief Urine Lab Results  (Last result in the past 365 days)        Color   Clarity   Blood   Leuk Est   Nitrite   Protein   CREAT   Urine HCG    "     10/09/24 1542 Dark Yellow   Clear   Trace   Negative   Negative   Negative                   Lab Results   Component Value Date    GLUCOSE 135 (H) 06/12/2023    CALCIUM 9.9 06/12/2023     06/12/2023    K 4.2 06/12/2023    CO2 29.0 06/12/2023     06/12/2023    BUN 38 (H) 06/12/2023    CREATININE 1.37 (H) 06/12/2023    BCR 27.7 (H) 06/12/2023    ANIONGAP 10.0 06/12/2023       Lab Results   Component Value Date    WBC 7.33 06/12/2023    HGB 15.4 06/12/2023    HCT 46.7 06/12/2023    MCV 93.8 06/12/2023     06/12/2023        No results found for: \"PSA\"    No components found for: \"TESTFRE\", \"TESTOSTEROTT\"     Radiographic Studies  CT Chest Low Dose Cancer Screening WO  Result Date: 10/16/2024  Stable subcentimeter nodules in the bilateral lower lobes are likely granulomas. Lung RADS category 2.  RECOMMENDATIONS:Annual low-dose chest CT.    Images were reviewed, interpreted, and dictated by Dr. Karthikeyan Mejia MD Transcribed by Estrella Segura PA-C.  This report was signed and finalized on 10/16/2024 2:33 PM by Karthikeyan Mejia MD.        I have reviewed above labs and imaging.     CYSTOSCOPY  Preprocedure diagnosis  LUTS  Postprocedure diagnosis  Same  Procedure  Flexible Cystourethroscopy  Attending surgeon  Willy Chisholm MD  Anesthesia  2% lidocaine jelly intraurethrally  Complications  None  Indications  69 y.o. male undergoing a flexible cystoscopy for the above mentioned indications.  Informed consent was obtained.    Findings  Cystoscopic findings included one right and left ureteral orifice in the normal anatomic position with normal bladder mucosa and no tumors, masses or stones. The urethral urothelium was within normal limits with no strictures.  There was not a prominent median lobe.  The lateral lobes were large and quite obstructive in appearance.  The prostate extended well into the bladder.  Procedure  The patient was placed in supine position and prepped and draped in sterile " fashion with lidocaine jelly per urethra for anesthesia.  A timeout was performed.  The 14F flexible cystoscope was lubricated and gently placed through the penile urethra and into the bladder.  The bladder was completely visualized.  The cystoscope was retroflexed and the bladder neck and prostate visualized.  The cystoscope was slowly withdrawn while visualizing the urethra and the procedure terminated.  The patient tolerated the procedure well.      I measured his prostate using an ellipsoid formula and it was 70 cc on his MRI of the hip and pelvis      I personally reviewed  and interpreted this study.       Assessment  69 y.o. male with worsening of chronic lower urinary tract symptoms. He has been on Finasteride and Flomax for the past 4-5 years. Symptoms started worsening over the past 2 months.  Does not feel like symptoms are controlled with medications. PVR is 0 ml.  UA is benign. PSA adjusted for Finasteride is 1.94. IPSS 15. Prostate 70 cc, occlusive, with significant intravesical extension.    In a separate room and encounter after his workup, we discussed aqua ablation versus HoLEP with their pros and cons. I recommended Aquablation at [facility]. It would be safer with less anesthesia time, given his COPD    Plan  1.  Get pulmonary risk stratification from Dr Corey  2. FU in 2 weeks to discuss decision.  Patient would like to discuss it with his wife.

## 2025-01-17 ENCOUNTER — TELEPHONE (OUTPATIENT)
Dept: UROLOGY | Facility: CLINIC | Age: 70
End: 2025-01-17
Payer: MEDICARE

## 2025-01-17 NOTE — TELEPHONE ENCOUNTER
Des for HUB to Relay PER Angelina Practice Manager:     Dr. Chisholm will be out of the office Monday Jan 20th, patient will need to reschedule.

## 2025-01-20 ENCOUNTER — TELEPHONE (OUTPATIENT)
Dept: UROLOGY | Facility: CLINIC | Age: 70
End: 2025-01-20

## 2025-01-20 NOTE — TELEPHONE ENCOUNTER
Caller: Stephan Gibbs    Relationship: Self    Best call back number: [phone]    What is the best time to reach you: ANY    Who are you requesting to speak with (clinical staff, provider,  specific staff member): CLINICAL    Do you know the name of the person who called: IGNACIO    What was the call regarding: RESCHEDULE APPT ON 1/20/25 DUE TO PROVIDER OUT OF OFFICE.  NO AVAILABILITY UNTIL THE END OF FEB, UNABLE TO WARM REINA TO CLINICAL.    PLEASE CALL PT TO RESCHEDULE APPT

## 2025-01-24 NOTE — TELEPHONE ENCOUNTER
OK for hub to relay: left voicemail for pt, rescheduled his appointment from jan 20 when  was out to Jan 30 at 3:10pm

## 2025-01-30 ENCOUNTER — OFFICE VISIT (OUTPATIENT)
Dept: UROLOGY | Facility: CLINIC | Age: 70
End: 2025-01-30
Payer: MEDICARE

## 2025-01-30 VITALS
WEIGHT: 235 LBS | OXYGEN SATURATION: 96 % | SYSTOLIC BLOOD PRESSURE: 126 MMHG | TEMPERATURE: 98 F | BODY MASS INDEX: 31.14 KG/M2 | HEART RATE: 93 BPM | DIASTOLIC BLOOD PRESSURE: 82 MMHG | HEIGHT: 73 IN

## 2025-01-30 DIAGNOSIS — R39.12 BENIGN PROSTATIC HYPERPLASIA WITH WEAK URINARY STREAM: Primary | ICD-10-CM

## 2025-01-30 DIAGNOSIS — N40.1 BENIGN PROSTATIC HYPERPLASIA WITH WEAK URINARY STREAM: Primary | ICD-10-CM

## 2025-01-30 NOTE — PROGRESS NOTES
"CC  LUTS / BPH     HPI  Ms. Gibbs is a 69 y.o. male with history below in assessment, who presents for follow up.     At this visit patient is here for BPH follow-up.  Unfortunately he has not seen Dr. Corey since our last visit.  He has follow-up with them in March.    Past Medical History:   Diagnosis Date    Benign prostatic hyperplasia     Chronic bronchitis     Chronic kidney disease 10/10/23    COPD (chronic obstructive pulmonary disease) 2019    Fracture, fibula 11/23/1974    Fracture, tibia and fibula 11/23/974    Hypertension        Past Surgical History:   Procedure Laterality Date    HAND SURGERY  06/07/2024    Left middle finger dupuytrens fasciectomy including PIP release;  Left ring finger dupuytrens fasciectomy including PIP release, each additional digit; Left middle finger trigger release; Left ring finger trigger release Dr. Vitaliy Becerra    HERNIA REPAIR      JOINT REPLACEMENT  10/07/2023    KNEE SURGERY  10/07/2023    LEG SURGERY           Current Outpatient Medications:     allopurinol (ZYLOPRIM) 300 MG tablet, Take 1 tablet by mouth Daily., Disp: , Rfl:     finasteride (PROSCAR) 5 MG tablet, Take 1 tablet by mouth Daily., Disp: , Rfl:     gabapentin (NEURONTIN) 300 MG capsule, 1 capsule 4 (Four) Times a Day., Disp: , Rfl:     rosuvastatin (CRESTOR) 20 MG tablet, Take  by mouth., Disp: , Rfl:     tamsulosin (FLOMAX) 0.4 MG capsule 24 hr capsule, , Disp: , Rfl:     telmisartan (MICARDIS) 20 MG tablet, Take 0.5 tablets by mouth Daily., Disp: , Rfl:     tiotropium bromide-olodaterol (Stiolto Respimat) 2.5-2.5 MCG/ACT aerosol solution inhaler, Inhale 2 puffs Daily., Disp: 4 g, Rfl: 11    albuterol sulfate HFA (Ventolin HFA) 108 (90 Base) MCG/ACT inhaler, Inhale 2 puffs Every 4 (Four) Hours As Needed for Wheezing or Shortness of Air. (Patient not taking: Reported on 1/30/2025), Disp: 18 g, Rfl: 5     Physical Exam  Visit Vitals  /82   Pulse 93   Temp 98 °F (36.7 °C)   Ht 185.4 cm (73\") " "  Wt 107 kg (235 lb)   SpO2 96%   BMI 31.00 kg/m²       Labs  Brief Urine Lab Results  (Last result in the past 365 days)        Color   Clarity   Blood   Leuk Est   Nitrite   Protein   CREAT   Urine HCG        10/09/24 1542 Dark Yellow   Clear   Trace   Negative   Negative   Negative                   Lab Results   Component Value Date    GLUCOSE 135 (H) 06/12/2023    CALCIUM 9.9 06/12/2023     06/12/2023    K 4.2 06/12/2023    CO2 29.0 06/12/2023     06/12/2023    BUN 38 (H) 06/12/2023    CREATININE 1.37 (H) 06/12/2023    BCR 27.7 (H) 06/12/2023    ANIONGAP 10.0 06/12/2023       Lab Results   Component Value Date    WBC 7.33 06/12/2023    HGB 15.4 06/12/2023    HCT 46.7 06/12/2023    MCV 93.8 06/12/2023     06/12/2023        No results found for: \"PSA\"    No components found for: \"TESTFRE\", \"TESTOSTEROTT\"     Radiographic Studies  CT Chest Low Dose Cancer Screening WO  Result Date: 10/16/2024  Stable subcentimeter nodules in the bilateral lower lobes are likely granulomas. Lung RADS category 2.  RECOMMENDATIONS:Annual low-dose chest CT.    Images were reviewed, interpreted, and dictated by Dr. Karthikeyan Mejia MD Transcribed by Estrella Segura PA-C.  This report was signed and finalized on 10/16/2024 2:33 PM by Karthikeyan Mejia MD.      I have reviewed above labs and imaging.     Past cystoscopic findings included one right and left ureteral orifice in the normal anatomic position with normal bladder mucosa and no tumors, masses or stones. The urethral urothelium was within normal limits with no strictures.  There was not a prominent median lobe.  The lateral lobes were large and quite obstructive in appearance.  The prostate extended well into the bladder.    I measured his prostate using an ellipsoid formula and it was 70 cc on his MRI of the hip and pelvis        Assessment  69 y.o. male with worsening of chronic lower urinary tract symptoms. He has been on Finasteride and Flomax for the past 4-5 " years. Symptoms started worsening over the past 2 months.  Does not feel like symptoms are controlled with medications. PVR is 0 ml.  UA is benign. PSA adjusted for Finasteride is 1.94. IPSS 15. Prostate 70 cc, occlusive, with significant intravesical extension.    Today we once again discussed aqua ablation versus HoLEP with their pros and cons. I recommended Aquablation at [facility]. It would be safer with less anesthesia time, given his COPD.  He did have successful hand surgery at [facility] last June.    Plan  1.  Get pulmonary risk stratification from Dr Corey   2.  FU in April to discuss surgery again per his preference.  He says he is still somewhat on the fence about whether or not he wants to have it.    I spent a total of 30 minutes with the patient and the chart engaging in data gathering and interpretation, patient interaction, as well as counseling on the risks, benefits, and alternatives of the therapy and coordinating care.

## 2025-03-18 ENCOUNTER — OFFICE VISIT (OUTPATIENT)
Dept: PULMONOLOGY | Facility: CLINIC | Age: 70
End: 2025-03-18
Payer: MEDICARE

## 2025-03-18 VITALS
DIASTOLIC BLOOD PRESSURE: 68 MMHG | RESPIRATION RATE: 16 BRPM | HEIGHT: 73 IN | BODY MASS INDEX: 32.6 KG/M2 | SYSTOLIC BLOOD PRESSURE: 124 MMHG | OXYGEN SATURATION: 96 % | HEART RATE: 91 BPM | WEIGHT: 246 LBS

## 2025-03-18 DIAGNOSIS — F17.210 NICOTINE DEPENDENCE, CIGARETTES, UNCOMPLICATED: Primary | ICD-10-CM

## 2025-03-18 DIAGNOSIS — J44.9 CHRONIC OBSTRUCTIVE PULMONARY DISEASE, UNSPECIFIED COPD TYPE: ICD-10-CM

## 2025-03-18 DIAGNOSIS — Z14.8 ALPHA-1-ANTITRYPSIN DEFICIENCY CARRIER: ICD-10-CM

## 2025-03-18 DIAGNOSIS — R91.8 LUNG NODULE, MULTIPLE: ICD-10-CM

## 2025-03-18 PROCEDURE — 99214 OFFICE O/P EST MOD 30 MIN: CPT | Performed by: NURSE PRACTITIONER

## 2025-03-18 RX ORDER — TIOTROPIUM BROMIDE AND OLODATEROL 3.124; 2.736 UG/1; UG/1
2 SPRAY, METERED RESPIRATORY (INHALATION) DAILY
Qty: 4 G | Refills: 11 | Status: SHIPPED | OUTPATIENT
Start: 2025-03-18

## 2025-03-18 NOTE — PROGRESS NOTES
"Chief Complaint   Patient presents with    Breathing Problem    Follow-up         Subjective   Stephan Gibbs is a 69 y.o. male.   Patient comes today for follow up of shortness of breath and COPD.     Symptoms have been stable since the last clinic visit. Patient reports no recent exacerbations.     Patient is using medications, as prescribed. He uses Stiolto daily.     He uses KADEN only if hiking or doing a lot of yard work.    Exercise tolerance has also remained stable.     Continues to smoke a little over 1 pack per day.       The following portions of the patient's history were reviewed and updated as appropriate: allergies, current medications, past family history, past medical history, past social history, and past surgical history.      Review of Systems   HENT:  Negative for sinus pressure, sneezing and sore throat.    Respiratory:  Negative for cough, chest tightness, shortness of breath and wheezing.        Objective   Visit Vitals  /68   Pulse 91   Resp 16   Ht 185.4 cm (73\")   Wt 112 kg (246 lb)   SpO2 96%   BMI 32.46 kg/m²         Physical Exam  Vitals reviewed.   HENT:      Head: Atraumatic.      Mouth/Throat:      Mouth: Mucous membranes are moist.   Eyes:      Extraocular Movements: Extraocular movements intact.   Cardiovascular:      Rate and Rhythm: Normal rate and regular rhythm.   Pulmonary:      Effort: Pulmonary effort is normal. No respiratory distress.      Breath sounds: Normal breath sounds. No wheezing.   Skin:     General: Skin is warm.   Neurological:      Mental Status: He is alert and oriented to person, place, and time.           Assessment & Plan   Diagnoses and all orders for this visit:    1. Nicotine dependence, cigarettes, uncomplicated (Primary)  -      CT Chest Low Dose Cancer Screening WO; Future    2. Chronic obstructive pulmonary disease, unspecified COPD type    3. Lung nodule, multiple    4. Alpha-1-antitrypsin deficiency carrier    Other orders  -     " tiotropium bromide-olodaterol (Stiolto Respimat) 2.5-2.5 MCG/ACT aerosol solution inhaler; Inhale 2 puffs Daily.  Dispense: 4 g; Refill: 11           Return in about 40 weeks (around 12/23/2025) for For Dr. Corey, Imaging studies.    DISCUSSION (if any):  PFT today consistent with mild obstruction. No restriction with suggestion of air trapping. Preserved diffusion capacity.      Latest Reference Range & Units Most Recent   ALPHA -1 ANTITRYPSIN 90 - 200 mg/dL 82 (L)  6/13/17 16:54   Phenotype  MZ  6/13/17 16:54       No change to the current medications has been made. Continue Stiolto daily.     Compliance with medications stressed.     Side effects of prescribed medications discussed with the patient    Patient was strongly encouraged to quit smoking as soon as possible.    The patient belongs to the risk group for which lung cancer screening has been recommended. He will be due LDCT in October 2025 and this has been ordered today.    CT reviewed and stable nodules.     Study Result    Narrative & Impression   PROCEDURE: CT CHEST LOW DOSE CANCER SCREENING WO-     HISTORY: Lung cancer screening, >= 20 pk-yr smoking history (Age >=  50y); F17.210-Nicotine dependence, cigarettes, uncomplicated     COMPARISON: October 2023     TECHNIQUE: Axial images were obtained from the thoracic inlet through  the upper abdomen per the low-dose protocol. Coronal images were  reformatted. DLP 50.67 mGy.cm; CTDI 1.21 mGy     FINDINGS: There is emphysema. There are bilateral lower lobe pulmonary  nodules, largest measuring up to 6 mm in the right lower lobe on image  #317. There is interstitial scarring. There is no airspace disease. The  heart is normal in size. The aorta is normal in caliber. There are no  pleural or pericardial effusions. The visualized upper abdomen is  unremarkable. Bone windows reveal no acute osseous abnormalities.     IMPRESSION:  Stable subcentimeter nodules in the bilateral lower lobes  are likely granulomas.  Lung RADS category 2.     RECOMMENDATIONS:Annual low-dose chest CT.           Images were reviewed, interpreted, and dictated by Dr. Karthikeyan Mejia MD  Transcribed by Estrella Segura PA-C.     This report was signed and finalized on 10/16/2024 2:33 PM by Karthikeyan Mejia MD.       Dictated utilizing Dragon dictation.    This document was electronically signed by ALEXIA Barrios March 18, 2025  11:55 EDT

## 2025-03-31 ENCOUNTER — OFFICE VISIT (OUTPATIENT)
Dept: UROLOGY | Facility: CLINIC | Age: 70
End: 2025-03-31
Payer: MEDICARE

## 2025-03-31 VITALS
DIASTOLIC BLOOD PRESSURE: 76 MMHG | BODY MASS INDEX: 32.6 KG/M2 | TEMPERATURE: 98 F | HEART RATE: 82 BPM | WEIGHT: 246 LBS | OXYGEN SATURATION: 94 % | HEIGHT: 73 IN | SYSTOLIC BLOOD PRESSURE: 124 MMHG

## 2025-03-31 DIAGNOSIS — R39.12 BENIGN PROSTATIC HYPERPLASIA WITH WEAK URINARY STREAM: Primary | ICD-10-CM

## 2025-03-31 DIAGNOSIS — N40.1 BENIGN PROSTATIC HYPERPLASIA WITH WEAK URINARY STREAM: Primary | ICD-10-CM

## 2025-03-31 PROCEDURE — G2211 COMPLEX E/M VISIT ADD ON: HCPCS | Performed by: UROLOGY

## 2025-03-31 PROCEDURE — 99214 OFFICE O/P EST MOD 30 MIN: CPT | Performed by: UROLOGY

## 2025-03-31 PROCEDURE — 1160F RVW MEDS BY RX/DR IN RCRD: CPT | Performed by: UROLOGY

## 2025-03-31 PROCEDURE — 1159F MED LIST DOCD IN RCRD: CPT | Performed by: UROLOGY

## 2025-03-31 NOTE — PROGRESS NOTES
Office Visit     Patient Name: Stephan Gibbs  : 1955   MRN: [id number]     Chief Complaint  Chief Complaint   Patient presents with    Benign Prostatic Hypertrophy    Follow-up     Discuss aquablation       Referring Provider: No ref. provider found    Primary Care Provider: Bryce Parkinson MD     Subjective     History of Present Illness  The patient presents for benign prostatic hyperplasia.    Benign Prostatic Hyperplasia  - Diagnosed with benign prostatic hyperplasia, characterized by an enlarged prostate (70 cm³) causing obstruction.  - During his last visit on 2025, various procedures were discussed.  - Prefers surgical intervention and outpatient treatment due to aversion to hospital stays.  - Maintains an active lifestyle, including hiking, and can ascend two flights of stairs without difficulty.  - Reports minimal bleeding tendencies and decreased ejaculatory volume, but remains sexually active.    Pulmonary Status  - Consulted with a pulmonologist who optimized his respiratory status.  - Not dependent on home oxygen therapy.  - Currently on Stiolto.    SOCIAL HISTORY  Camping and smoking for 1-2 weeks.    MEDICATIONS  Stiolto           Past Medical History:   Diagnosis Date    Benign prostatic hyperplasia     Chronic bronchitis     Chronic kidney disease 10/10/23    COPD (chronic obstructive pulmonary disease) 2019    Fracture, fibula 1974    Fracture, tibia and fibula     Hypertension      Past Surgical History:   Procedure Laterality Date    HAND SURGERY  2024    Left middle finger dupuytrens fasciectomy including PIP release;  Left ring finger dupuytrens fasciectomy including PIP release, each additional digit; Left middle finger trigger release; Left ring finger trigger release Dr. Vitaliy Becerra    HERNIA REPAIR      JOINT REPLACEMENT  10/07/2023    KNEE SURGERY  10/07/2023    LEG SURGERY       Family History   Problem Relation Age of Onset     "Emphysema Father     Hypertension Father     COPD Father     Arrhythmia Mother     Lung disease Other     Stroke Other     Prostate cancer Neg Hx     Kidney cancer Neg Hx      Social History     Socioeconomic History    Marital status:    Tobacco Use    Smoking status: Every Day     Current packs/day: 1.50     Average packs/day: 1.5 packs/day for 57.8 years (86.7 ttl pk-yrs)     Types: Cigarettes     Start date: 6/1/1967     Passive exposure: Past    Smokeless tobacco: Never   Vaping Use    Vaping status: Never Used   Substance and Sexual Activity    Alcohol use: Yes     Alcohol/week: 3.0 - 6.0 standard drinks of alcohol     Types: 3 - 6 Cans of beer per week    Drug use: No    Sexual activity: Yes     Partners: Female     Birth control/protection: None       Current Outpatient Medications:     albuterol sulfate HFA (Ventolin HFA) 108 (90 Base) MCG/ACT inhaler, Inhale 2 puffs Every 4 (Four) Hours As Needed for Wheezing or Shortness of Air., Disp: 18 g, Rfl: 5    allopurinol (ZYLOPRIM) 300 MG tablet, Take 1 tablet by mouth Daily., Disp: , Rfl:     finasteride (PROSCAR) 5 MG tablet, Take 1 tablet by mouth Daily., Disp: , Rfl:     gabapentin (NEURONTIN) 300 MG capsule, 1 capsule 4 (Four) Times a Day., Disp: , Rfl:     rosuvastatin (CRESTOR) 20 MG tablet, Take  by mouth., Disp: , Rfl:     tamsulosin (FLOMAX) 0.4 MG capsule 24 hr capsule, , Disp: , Rfl:     telmisartan (MICARDIS) 20 MG tablet, Take 0.5 tablets by mouth Daily., Disp: , Rfl:     tiotropium bromide-olodaterol (Stiolto Respimat) 2.5-2.5 MCG/ACT aerosol solution inhaler, Inhale 2 puffs Daily., Disp: 4 g, Rfl: 11  No Known Allergies  Objective   Visit Vitals  /76   Pulse 82   Temp 98 °F (36.7 °C) (Temporal)   Ht 185.4 cm (73\")   Wt 112 kg (246 lb)   SpO2 94%   BMI 32.46 kg/m²      Body mass index is 32.46 kg/m².     Physical exam was notable for: Mild obesity    Labs  Lab Results   Component Value Date    COLORU Dark Yellow 10/09/2024    CLARITYU " "Clear 10/09/2024    SPECGRAV 1.020 10/09/2024    PHUR 6.5 10/09/2024    LEUKOCYTESUR Negative 10/09/2024    NITRITE Negative 10/09/2024    PROTEINPOCUA Negative 10/09/2024    GLUCOSEUR Negative 10/09/2024    KETONESU Negative 10/09/2024    UROBILINOGEN Normal 10/09/2024    BILIRUBINUR Negative 10/09/2024    RBCUR Trace (A) 10/09/2024      No results found for: \"WBCUA\", \"RBCUA\", \"BACTERIA\", \"HYALCASTU\", \"SQUAMEPIUA\"     Lab Results   Component Value Date    WBC 7.33 06/12/2023    HGB 15.4 06/12/2023    HCT 46.7 06/12/2023    MCV 93.8 06/12/2023     06/12/2023     Lab Results   Component Value Date    GLUCOSE 135 (H) 06/12/2023    CALCIUM 9.9 06/12/2023     06/12/2023    K 4.2 06/12/2023    CO2 29.0 06/12/2023     06/12/2023    BUN 38 (H) 06/12/2023    BUN 16 01/19/2016    CREATININE 1.37 (H) 06/12/2023    CREATININE 1.0 01/19/2016    EGFR 55.8 (L) 06/12/2023    BCR 27.7 (H) 06/12/2023    ANIONGAP 10.0 06/12/2023       No results found for: \"HGBA1C\"     No results found for: \"PSA\", \"PCTFREEPSA\", \"PROSTATEBIO\"    No results found for: \"URICACIDSTN\", \"TJDH5IFWULJ\", \"PFEV2FGPTZ\", \"LABMAGN\"  No results found for: \"DFXA54KF\", \"CAION\", \"PTH\", \"URICACID\"  No results found for: \"CYSTINE\", \"URINEVOLUM\", \"CALCIUMUR\", \"OXALATEU\", \"CITRATEUR\", \"LABPH\", \"URICUR\", \"NAUR\", \"KUR\", \"MAGNESIUMUR\", \"PHOSUR\", \"AMMONIUMUR\", \"CLUR\", \"[id number]\", \"UREAUR\", \"LABCREAUR\"    No results found for: \"TESTOSTEROTT\", \"TESTFRE\", \"PSA\", \"ESTRADIOL\", \"LH\", \"PROLACTIN\", \"FSH\"      Radiographic Studies  No Images in the past 120 days found..    I have reviewed the above labs and imaging.     IPSS was measured in the past and is over 12.    Assessment / Plan      Diagnoses and all orders for this visit:    1. Benign prostatic hyperplasia with weak urinary stream (Primary)    69-year-old male with worsening of chronic lower urinary tract symptoms. He has been on Finasteride and Flomax for the past 4-5 years. Symptoms started worsening " over the past 2 months.  Does not feel like symptoms are controlled with medications. PVR is 0 ml.  UA is benign. PSA adjusted for Finasteride is 1.94. IPSS 15. Prostate 70 cc, occlusive, with significant intravesical extension.    Today we once again discussed aqua ablation versus HoLEP with their pros and cons. I recommended Aquablation at [facility]. It would be safer with less anesthesia time, given his COPD.  He did have successful hand surgery at [facility] last June.      Assessment & Plan  1. Benign Prostatic Hyperplasia  - Prostate significantly enlarged (70 cm³), causing obstruction  - Optimized from a pulmonary standpoint by pulmonologist, currently using Stiolto inhaler  - Informed about potential procedure risks: bleeding, infection, bladder/urethra damage, stress incontinence, retrograde ejaculation  - Patient desires to preserve ejaculation, achievable with planned aquablation procedure  - Discussed aquablation, including general anesthesia and same-day discharge if minimal bleeding  - Aquablation scheduled for first Monday in June at [facility] -we will do this outpatient     2. Chronic Obstructive Pulmonary Disease  - Currently using Stiolto inhaler, optimized from a pulmonary standpoint by pulmonologist  - Not on home oxygen  - Remains active, engaging in hiking and climbing stairs without difficulty         Patient or patient representative verbalized consent for the use of Ambient Listening during the visit with  Willy Chisholm MD for chart documentation. 3/31/2025  11:25 EDT    Willy Chisholm MD  Urologic Surgery  [facility]  [id number]   [place] [address]

## 2025-05-19 ENCOUNTER — PRE-ADMISSION TESTING (OUTPATIENT)
Dept: PREADMISSION TESTING | Facility: HOSPITAL | Age: 70
End: 2025-05-19
Payer: MEDICARE

## 2025-05-19 VITALS — HEIGHT: 73 IN | BODY MASS INDEX: 32.32 KG/M2 | WEIGHT: 243.83 LBS

## 2025-05-19 LAB
QT INTERVAL: 360 MS
QTC INTERVAL: 420 MS

## 2025-05-19 PROCEDURE — 85027 COMPLETE CBC AUTOMATED: CPT | Performed by: UROLOGY

## 2025-05-19 PROCEDURE — 81003 URINALYSIS AUTO W/O SCOPE: CPT | Performed by: UROLOGY

## 2025-05-19 PROCEDURE — 93010 ELECTROCARDIOGRAM REPORT: CPT | Performed by: STUDENT IN AN ORGANIZED HEALTH CARE EDUCATION/TRAINING PROGRAM

## 2025-05-19 PROCEDURE — 80048 BASIC METABOLIC PNL TOTAL CA: CPT | Performed by: UROLOGY

## 2025-05-19 PROCEDURE — 93005 ELECTROCARDIOGRAM TRACING: CPT

## 2025-05-19 NOTE — PAT
An arrival time for procedure was not provided during PAT visit. If patient had any questions or concerns about their arrival time, they were instructed to contact their surgeon/physician.  Additionally, if the patient referred to an arrival time that was acquired from their my chart account, patient was encouraged to verify that time with their surgeon/physician. Arrival times are NOT provided in Pre Admission Testing Department.    Per Anesthesia Request, patient instructed not to take their ACE/ARB medications on the AM of surgery.    Post-Surgery Information Instruction Sheet given to patient during Pre-Admission Testing Visit with verbal instructions to patient to return with PAT PASS on the day of surgery. Additionally, encouraged patient to review the information provided.    Patient denies any current skin issues.     Patient to apply Chlorhexadine wipes  to surgical area (as instructed) the night before procedure and the AM of procedure. Wipes provided.

## 2025-05-27 ENCOUNTER — TELEPHONE (OUTPATIENT)
Dept: UROLOGY | Facility: CLINIC | Age: 70
End: 2025-05-27
Payer: MEDICARE

## 2025-05-27 NOTE — TELEPHONE ENCOUNTER
Left message for patient to call me.  He needs to be scheduled to come in for a Uroflow this week.

## 2025-05-28 ENCOUNTER — PROCEDURE VISIT (OUTPATIENT)
Dept: UROLOGY | Facility: CLINIC | Age: 70
End: 2025-05-28
Payer: MEDICARE

## 2025-05-28 DIAGNOSIS — N40.1 BENIGN PROSTATIC HYPERPLASIA WITH WEAK URINARY STREAM: Primary | ICD-10-CM

## 2025-05-28 DIAGNOSIS — R39.12 BENIGN PROSTATIC HYPERPLASIA WITH WEAK URINARY STREAM: Primary | ICD-10-CM

## 2025-05-28 NOTE — PROGRESS NOTES
TRUS OF PROSTATE    Preoperative diagnosis  LUTS/BPH    Postoperative diagnosis  Same    Procedure  1.  Transrectal ultrasound of the prostate    Attending Surgeon  Willy Chisholm MD    Anesthesia  2% lidocaine jelly, intrarectal instillation, 10mL    Complications  None    Specimen  None    Indications  Mr. Gibbs is a 69 y.o. male with LUTS.  He presents for prostate ultrasound to evaluate prostate size.    Procedure  The patient was positioned and prepped in a left lateral position with lower extremities flexed.  Lidocaine jelly, 2%, was injected per rectum. A digital rectal exam was performed which demonstrated a smooth prostate without nodules or induration. The [employer] E8CS rectal ultrasound probe was slowly introduced into the rectum without difficulty.  The prostate and seminal vesicles were inspected systematically using cross and sagittal views with the ultrasound. A median lobe was not seen.  The dimensions of the prostate were measured, for a calculated volume of 35 mL.  The seminal vesicles appeared normal.  The rectal ultrasound probe was removed.  The patient tolerated the procedure well.    Uroflow:  Peak flow rate - 4.4 mL/sec  Average flow rate - 1.9 mL/sec  Flow curve -small bump  Voided volume -17 mL    I personally reviewed  and interpreted this study.

## 2025-05-29 DIAGNOSIS — N40.1 BENIGN PROSTATIC HYPERPLASIA WITH WEAK URINARY STREAM: Primary | ICD-10-CM

## 2025-05-29 DIAGNOSIS — R39.12 BENIGN PROSTATIC HYPERPLASIA WITH WEAK URINARY STREAM: Primary | ICD-10-CM

## 2025-05-29 RX ORDER — SODIUM CHLORIDE 0.9 % (FLUSH) 0.9 %
3-10 SYRINGE (ML) INJECTION AS NEEDED
OUTPATIENT
Start: 2025-05-29

## 2025-05-29 RX ORDER — SODIUM CHLORIDE 0.9 % (FLUSH) 0.9 %
3 SYRINGE (ML) INJECTION EVERY 12 HOURS SCHEDULED
OUTPATIENT
Start: 2025-05-29

## 2025-05-29 RX ORDER — SODIUM CHLORIDE 9 MG/ML
40 INJECTION, SOLUTION INTRAVENOUS AS NEEDED
OUTPATIENT
Start: 2025-05-29

## 2025-05-29 RX ORDER — SODIUM CHLORIDE 9 MG/ML
50 INJECTION, SOLUTION INTRAVENOUS CONTINUOUS
OUTPATIENT
Start: 2025-05-29 | End: 2025-05-30

## 2025-05-30 ENCOUNTER — TELEMEDICINE (OUTPATIENT)
Dept: UROLOGY | Facility: CLINIC | Age: 70
End: 2025-05-30
Payer: MEDICARE

## 2025-05-30 DIAGNOSIS — N40.1 BENIGN PROSTATIC HYPERPLASIA WITH WEAK URINARY STREAM: Primary | ICD-10-CM

## 2025-05-30 DIAGNOSIS — R39.12 BENIGN PROSTATIC HYPERPLASIA WITH WEAK URINARY STREAM: Primary | ICD-10-CM

## 2025-05-30 NOTE — PROGRESS NOTES
CC  BPH with weak urinary stream    HPI  Mr. Gibbs is a 69 y.o. male with history below in assessment, who presents for follow up.     At this visit patient wanted to discuss his options, since his  aquablation surgery was once again denied by the insurance company due to inadequate volume of urine in the uroflow.    Past Medical History:   Diagnosis Date    Benign prostatic hyperplasia     Chronic bronchitis     Chronic kidney disease 10/10/23    COPD (chronic obstructive pulmonary disease) 2019    Fracture, fibula 11/23/1974    Fracture, tibia and fibula 11/23/974    Hyperlipidemia     Hypertension     Sleep apnea     QHS       Past Surgical History:   Procedure Laterality Date    COLONOSCOPY      EYE SURGERY Bilateral     Cataract    HAND SURGERY  06/07/2024    Left middle finger dupuytrens fasciectomy including PIP release;  Left ring finger dupuytrens fasciectomy including PIP release, each additional digit; Left middle finger trigger release; Left ring finger trigger release Dr. Vitaliy Becerra    HERNIA REPAIR      Umbilical, and inguinal    KNEE SURGERY Left 10/07/2023    TKA    LEG SURGERY  1974    Compound FX repair         Current Outpatient Medications:     albuterol sulfate HFA (Ventolin HFA) 108 (90 Base) MCG/ACT inhaler, Inhale 2 puffs Every 4 (Four) Hours As Needed for Wheezing or Shortness of Air., Disp: 18 g, Rfl: 5    finasteride (PROSCAR) 5 MG tablet, Take 1 tablet by mouth Daily., Disp: , Rfl:     gabapentin (NEURONTIN) 300 MG capsule, 1 capsule 4 (Four) Times a Day., Disp: , Rfl:     rosuvastatin (CRESTOR) 20 MG tablet, Take  by mouth., Disp: , Rfl:     tamsulosin (FLOMAX) 0.4 MG capsule 24 hr capsule, , Disp: , Rfl:     telmisartan (MICARDIS) 20 MG tablet, Take 1 tablet by mouth Daily., Disp: , Rfl:     tiotropium bromide-olodaterol (Stiolto Respimat) 2.5-2.5 MCG/ACT aerosol solution inhaler, Inhale 2 puffs Daily., Disp: 4 g, Rfl: 11     Physical Exam  There were no vitals taken for this  "visit.    Labs  Brief Urine Lab Results  (Last result in the past 365 days)        Color   Clarity   Blood   Leuk Est   Nitrite   Protein   CREAT   Urine HCG        05/19/25 1129 Yellow   Clear   Negative   Negative   Negative   Negative                   Lab Results   Component Value Date    GLUCOSE 99 05/19/2025    CALCIUM 9.3 05/19/2025     05/19/2025    K 4.5 05/19/2025    CO2 25.0 05/19/2025     05/19/2025    BUN 17 05/19/2025    CREATININE 1.01 05/19/2025    BCR 16.8 05/19/2025    ANIONGAP 10.0 05/19/2025       Lab Results   Component Value Date    WBC 6.84 05/19/2025    HGB 15.6 05/19/2025    HCT 47.2 05/19/2025    MCV 92.5 05/19/2025     05/19/2025            No results found for: \"PSA\"    No results found for: \"TESTOSTERONE FREE\", \"TESTOSTERONE\", \"TOTAL\", \"TESTOSTERONE, TOTAL\"     Radiographic Studies  No Images in the past 120 days found..      I have reviewed above labs and imaging.     Assessment  69 y.o. male with worsening of chronic lower urinary tract symptoms, on maximal medical therapy, was scheduled to have aqua ablation, but insurance company canceled it due to lack of amount of urine in the uroflow.    We therefore had a video visit discussion about alternative options, including HoLEP.  However, the patient is adamant that he wants to be able to see his ejaculation, although he admits that currently it is greatly reduced on the tamsulosin.  He therefore wants to repeat a uroflow and try to get aqua ablation covered.    Plan  1.  Repeat uroflow and appeal insurance decision for aqua ablation, reschedule it in July.    This was an audio and video enabled telemedicine encounter.  The patient was at home and I was in the office.      "

## 2025-06-10 ENCOUNTER — PROCEDURE VISIT (OUTPATIENT)
Dept: UROLOGY | Facility: CLINIC | Age: 70
End: 2025-06-10
Payer: MEDICARE

## 2025-06-10 DIAGNOSIS — N40.1 BENIGN PROSTATIC HYPERPLASIA WITH WEAK URINARY STREAM: Primary | ICD-10-CM

## 2025-06-10 DIAGNOSIS — R39.12 BENIGN PROSTATIC HYPERPLASIA WITH WEAK URINARY STREAM: Primary | ICD-10-CM

## 2025-06-20 ENCOUNTER — PROCEDURE VISIT (OUTPATIENT)
Dept: UROLOGY | Facility: CLINIC | Age: 70
End: 2025-06-20
Payer: MEDICARE

## 2025-06-20 DIAGNOSIS — R39.12 BENIGN PROSTATIC HYPERPLASIA WITH WEAK URINARY STREAM: Primary | ICD-10-CM

## 2025-06-20 DIAGNOSIS — N40.1 BENIGN PROSTATIC HYPERPLASIA WITH WEAK URINARY STREAM: Primary | ICD-10-CM

## 2025-06-23 ENCOUNTER — PRE-ADMISSION TESTING (OUTPATIENT)
Dept: PREADMISSION TESTING | Facility: HOSPITAL | Age: 70
End: 2025-06-23
Payer: MEDICARE

## 2025-06-23 VITALS — WEIGHT: 243.61 LBS | BODY MASS INDEX: 32.29 KG/M2 | HEIGHT: 73 IN

## 2025-06-23 PROCEDURE — 81003 URINALYSIS AUTO W/O SCOPE: CPT | Performed by: UROLOGY

## 2025-06-23 PROCEDURE — 80048 BASIC METABOLIC PNL TOTAL CA: CPT | Performed by: UROLOGY

## 2025-06-23 PROCEDURE — 85027 COMPLETE CBC AUTOMATED: CPT | Performed by: UROLOGY

## 2025-06-23 NOTE — PAT
An arrival time for procedure was not provided during PAT visit. If patient had any questions or concerns about their arrival time, they were instructed to contact their surgeon/physician.  Additionally, if the patient referred to an arrival time that was acquired from their my chart account, patient was encouraged to verify that time with their surgeon/physician. Arrival times are NOT provided in Pre Admission Testing Department.    Per Anesthesia Request, patient instructed not to take their ACE/ARB medications on the AM of surgery.    EKG on chart from 5/19/25, patient denies chest pain and shortness of breath.    Patient denies any current skin issues.

## 2025-07-06 ENCOUNTER — ANESTHESIA EVENT (OUTPATIENT)
Dept: PERIOP | Facility: HOSPITAL | Age: 70
End: 2025-07-06
Payer: MEDICARE

## 2025-07-06 RX ORDER — SODIUM CHLORIDE 0.9 % (FLUSH) 0.9 %
10 SYRINGE (ML) INJECTION EVERY 12 HOURS SCHEDULED
Status: CANCELLED | OUTPATIENT
Start: 2025-07-06

## 2025-07-06 RX ORDER — SODIUM CHLORIDE 0.9 % (FLUSH) 0.9 %
10 SYRINGE (ML) INJECTION AS NEEDED
Status: CANCELLED | OUTPATIENT
Start: 2025-07-06

## 2025-07-06 RX ORDER — FAMOTIDINE 10 MG/ML
20 INJECTION, SOLUTION INTRAVENOUS ONCE
Status: CANCELLED | OUTPATIENT
Start: 2025-07-06 | End: 2025-07-06

## 2025-07-07 ENCOUNTER — HOSPITAL ENCOUNTER (OUTPATIENT)
Facility: HOSPITAL | Age: 70
Setting detail: HOSPITAL OUTPATIENT SURGERY
Discharge: HOME | End: 2025-07-07
Attending: UROLOGY | Admitting: UROLOGY
Payer: MEDICARE

## 2025-07-07 ENCOUNTER — ANESTHESIA (OUTPATIENT)
Dept: PERIOP | Facility: HOSPITAL | Age: 70
End: 2025-07-07
Payer: MEDICARE

## 2025-07-07 VITALS
RESPIRATION RATE: 16 BRPM | TEMPERATURE: 98 F | OXYGEN SATURATION: 98 % | HEIGHT: 73 IN | DIASTOLIC BLOOD PRESSURE: 89 MMHG | HEART RATE: 60 BPM | BODY MASS INDEX: 32.2 KG/M2 | SYSTOLIC BLOOD PRESSURE: 140 MMHG | WEIGHT: 243 LBS

## 2025-07-07 DIAGNOSIS — R39.12 BENIGN PROSTATIC HYPERPLASIA WITH WEAK URINARY STREAM: ICD-10-CM

## 2025-07-07 DIAGNOSIS — N40.1 BENIGN PROSTATIC HYPERPLASIA WITH WEAK URINARY STREAM: ICD-10-CM

## 2025-07-07 PROCEDURE — C2596 PROBE, ROBOTIC, WATER-JET: HCPCS | Performed by: UROLOGY

## 2025-07-07 PROCEDURE — 25010000002 AMPICILLIN-SULBACTAM PER 1.5 G: Performed by: UROLOGY

## 2025-07-07 PROCEDURE — 88305 TISSUE EXAM BY PATHOLOGIST: CPT | Performed by: UROLOGY

## 2025-07-07 PROCEDURE — 0421T PR TRANSURETHRAL WATERJET ABLATION PROSTATE COMPL: CPT | Performed by: UROLOGY

## 2025-07-07 PROCEDURE — 25010000002 FENTANYL CITRATE (PF) 100 MCG/2ML SOLUTION: Performed by: NURSE ANESTHETIST, CERTIFIED REGISTERED

## 2025-07-07 PROCEDURE — 25010000002 LIDOCAINE PF 1% 1 % SOLUTION: Performed by: ANESTHESIOLOGY

## 2025-07-07 PROCEDURE — 25010000002 SUGAMMADEX 200 MG/2ML SOLUTION: Performed by: NURSE ANESTHETIST, CERTIFIED REGISTERED

## 2025-07-07 PROCEDURE — 25010000002 TOBRAMYCIN PER 80 MG: Performed by: UROLOGY

## 2025-07-07 PROCEDURE — 25010000002 FLUCONAZOLE PER 200 MG: Performed by: NURSE ANESTHETIST, CERTIFIED REGISTERED

## 2025-07-07 PROCEDURE — 25010000002 ONDANSETRON PER 1 MG: Performed by: NURSE ANESTHETIST, CERTIFIED REGISTERED

## 2025-07-07 PROCEDURE — S0260 H&P FOR SURGERY: HCPCS | Performed by: UROLOGY

## 2025-07-07 PROCEDURE — 25810000003 LACTATED RINGERS PER 1000 ML: Performed by: ANESTHESIOLOGY

## 2025-07-07 PROCEDURE — 25010000002 PROPOFOL 10 MG/ML EMULSION: Performed by: NURSE ANESTHETIST, CERTIFIED REGISTERED

## 2025-07-07 PROCEDURE — 25010000002 LIDOCAINE PF 1% 1 % SOLUTION: Performed by: NURSE ANESTHETIST, CERTIFIED REGISTERED

## 2025-07-07 RX ORDER — ONDANSETRON 2 MG/ML
INJECTION INTRAMUSCULAR; INTRAVENOUS AS NEEDED
Status: DISCONTINUED | OUTPATIENT
Start: 2025-07-07 | End: 2025-07-07 | Stop reason: SURG

## 2025-07-07 RX ORDER — ONDANSETRON 2 MG/ML
4 INJECTION INTRAMUSCULAR; INTRAVENOUS ONCE AS NEEDED
Status: DISCONTINUED | OUTPATIENT
Start: 2025-07-07 | End: 2025-07-07 | Stop reason: HOSPADM

## 2025-07-07 RX ORDER — TRANEXAMIC ACID 10 MG/ML
1000 INJECTION, SOLUTION INTRAVENOUS ONCE
Status: COMPLETED | OUTPATIENT
Start: 2025-07-07 | End: 2025-07-07

## 2025-07-07 RX ORDER — FAMOTIDINE 20 MG/1
20 TABLET, FILM COATED ORAL ONCE
Status: COMPLETED | OUTPATIENT
Start: 2025-07-07 | End: 2025-07-07

## 2025-07-07 RX ORDER — PROPOFOL 10 MG/ML
VIAL (ML) INTRAVENOUS AS NEEDED
Status: DISCONTINUED | OUTPATIENT
Start: 2025-07-07 | End: 2025-07-07 | Stop reason: SURG

## 2025-07-07 RX ORDER — IPRATROPIUM BROMIDE AND ALBUTEROL SULFATE 2.5; .5 MG/3ML; MG/3ML
3 SOLUTION RESPIRATORY (INHALATION) ONCE AS NEEDED
Status: DISCONTINUED | OUTPATIENT
Start: 2025-07-07 | End: 2025-07-07 | Stop reason: HOSPADM

## 2025-07-07 RX ORDER — MAGNESIUM HYDROXIDE 1200 MG/15ML
LIQUID ORAL AS NEEDED
Status: DISCONTINUED | OUTPATIENT
Start: 2025-07-07 | End: 2025-07-07 | Stop reason: HOSPADM

## 2025-07-07 RX ORDER — HYDRALAZINE HYDROCHLORIDE 20 MG/ML
5 INJECTION INTRAMUSCULAR; INTRAVENOUS
Status: DISCONTINUED | OUTPATIENT
Start: 2025-07-07 | End: 2025-07-07 | Stop reason: HOSPADM

## 2025-07-07 RX ORDER — LABETALOL HYDROCHLORIDE 5 MG/ML
5 INJECTION, SOLUTION INTRAVENOUS
Status: DISCONTINUED | OUTPATIENT
Start: 2025-07-07 | End: 2025-07-07 | Stop reason: HOSPADM

## 2025-07-07 RX ORDER — FLUCONAZOLE 2 MG/ML
200 INJECTION, SOLUTION INTRAVENOUS ONCE
Status: COMPLETED | OUTPATIENT
Start: 2025-07-07 | End: 2025-07-07

## 2025-07-07 RX ORDER — FENTANYL CITRATE 50 UG/ML
50 INJECTION, SOLUTION INTRAMUSCULAR; INTRAVENOUS
Status: DISCONTINUED | OUTPATIENT
Start: 2025-07-07 | End: 2025-07-07 | Stop reason: HOSPADM

## 2025-07-07 RX ORDER — SODIUM CHLORIDE, SODIUM LACTATE, POTASSIUM CHLORIDE, CALCIUM CHLORIDE 600; 310; 30; 20 MG/100ML; MG/100ML; MG/100ML; MG/100ML
9 INJECTION, SOLUTION INTRAVENOUS CONTINUOUS
Status: DISCONTINUED | OUTPATIENT
Start: 2025-07-07 | End: 2025-07-07 | Stop reason: HOSPADM

## 2025-07-07 RX ORDER — ROCURONIUM BROMIDE 10 MG/ML
INJECTION, SOLUTION INTRAVENOUS AS NEEDED
Status: DISCONTINUED | OUTPATIENT
Start: 2025-07-07 | End: 2025-07-07 | Stop reason: SURG

## 2025-07-07 RX ORDER — LIDOCAINE HYDROCHLORIDE 10 MG/ML
INJECTION, SOLUTION EPIDURAL; INFILTRATION; INTRACAUDAL; PERINEURAL AS NEEDED
Status: DISCONTINUED | OUTPATIENT
Start: 2025-07-07 | End: 2025-07-07 | Stop reason: SURG

## 2025-07-07 RX ORDER — PHENAZOPYRIDINE HYDROCHLORIDE 100 MG/1
100 TABLET, FILM COATED ORAL DAILY PRN
Qty: 5 TABLET | Refills: 0 | Status: SHIPPED | OUTPATIENT
Start: 2025-07-07

## 2025-07-07 RX ORDER — HYDROMORPHONE HYDROCHLORIDE 1 MG/ML
0.5 INJECTION, SOLUTION INTRAMUSCULAR; INTRAVENOUS; SUBCUTANEOUS
Status: DISCONTINUED | OUTPATIENT
Start: 2025-07-07 | End: 2025-07-07 | Stop reason: HOSPADM

## 2025-07-07 RX ORDER — GINSENG 100 MG
CAPSULE ORAL AS NEEDED
Status: DISCONTINUED | OUTPATIENT
Start: 2025-07-07 | End: 2025-07-07 | Stop reason: HOSPADM

## 2025-07-07 RX ORDER — CEFDINIR 300 MG/1
300 CAPSULE ORAL 2 TIMES DAILY
Qty: 6 CAPSULE | Refills: 0 | Status: SHIPPED | OUTPATIENT
Start: 2025-07-07 | End: 2025-07-10

## 2025-07-07 RX ORDER — MIDAZOLAM HYDROCHLORIDE 1 MG/ML
0.5 INJECTION, SOLUTION INTRAMUSCULAR; INTRAVENOUS
Status: DISCONTINUED | OUTPATIENT
Start: 2025-07-07 | End: 2025-07-07 | Stop reason: HOSPADM

## 2025-07-07 RX ORDER — ACETAMINOPHEN 500 MG
1000 TABLET ORAL EVERY 6 HOURS
Qty: 30 TABLET | Refills: 0 | Status: SHIPPED | OUTPATIENT
Start: 2025-07-07 | End: 2025-07-11

## 2025-07-07 RX ORDER — LIDOCAINE HYDROCHLORIDE 10 MG/ML
0.5 INJECTION, SOLUTION EPIDURAL; INFILTRATION; INTRACAUDAL; PERINEURAL ONCE AS NEEDED
Status: COMPLETED | OUTPATIENT
Start: 2025-07-07 | End: 2025-07-07

## 2025-07-07 RX ORDER — OXYBUTYNIN CHLORIDE 10 MG/1
10 TABLET, EXTENDED RELEASE ORAL DAILY PRN
Qty: 10 TABLET | Refills: 0 | Status: SHIPPED | OUTPATIENT
Start: 2025-07-07

## 2025-07-07 RX ORDER — TRANEXAMIC ACID 10 MG/ML
1000 INJECTION, SOLUTION INTRAVENOUS ONCE
Status: DISCONTINUED | OUTPATIENT
Start: 2025-07-07 | End: 2025-07-07 | Stop reason: HOSPADM

## 2025-07-07 RX ORDER — FENTANYL CITRATE 50 UG/ML
INJECTION, SOLUTION INTRAMUSCULAR; INTRAVENOUS AS NEEDED
Status: DISCONTINUED | OUTPATIENT
Start: 2025-07-07 | End: 2025-07-07 | Stop reason: SURG

## 2025-07-07 RX ADMIN — LIDOCAINE HYDROCHLORIDE 50 MG: 10 INJECTION, SOLUTION EPIDURAL; INFILTRATION; INTRACAUDAL; PERINEURAL at 09:51

## 2025-07-07 RX ADMIN — TRANEXAMIC ACID 1000 MG: 10 INJECTION, SOLUTION INTRAVENOUS at 10:49

## 2025-07-07 RX ADMIN — FLUCONAZOLE 200 MG: 200 INJECTION, SOLUTION INTRAVENOUS at 10:34

## 2025-07-07 RX ADMIN — ROCURONIUM BROMIDE 50 MG: 10 INJECTION INTRAVENOUS at 09:51

## 2025-07-07 RX ADMIN — FAMOTIDINE 20 MG: 20 TABLET, FILM COATED ORAL at 08:32

## 2025-07-07 RX ADMIN — ROCURONIUM BROMIDE 20 MG: 10 INJECTION INTRAVENOUS at 10:05

## 2025-07-07 RX ADMIN — PROPOFOL 200 MG: 10 INJECTION, EMULSION INTRAVENOUS at 09:51

## 2025-07-07 RX ADMIN — LIDOCAINE HYDROCHLORIDE 0.5 ML: 10 INJECTION, SOLUTION EPIDURAL; INFILTRATION; INTRACAUDAL; PERINEURAL at 08:32

## 2025-07-07 RX ADMIN — FENTANYL CITRATE 100 MCG: 50 INJECTION, SOLUTION INTRAMUSCULAR; INTRAVENOUS at 09:51

## 2025-07-07 RX ADMIN — SODIUM CHLORIDE, POTASSIUM CHLORIDE, SODIUM LACTATE AND CALCIUM CHLORIDE 9 ML/HR: 600; 310; 30; 20 INJECTION, SOLUTION INTRAVENOUS at 08:32

## 2025-07-07 RX ADMIN — SODIUM CHLORIDE 3 G: 900 INJECTION INTRAVENOUS at 09:53

## 2025-07-07 RX ADMIN — FENTANYL CITRATE 100 MCG: 50 INJECTION, SOLUTION INTRAMUSCULAR; INTRAVENOUS at 10:44

## 2025-07-07 RX ADMIN — SUGAMMADEX 200 MG: 100 INJECTION, SOLUTION INTRAVENOUS at 11:02

## 2025-07-07 RX ADMIN — TRANEXAMIC ACID 1000 MG: 10 INJECTION, SOLUTION INTRAVENOUS at 09:59

## 2025-07-07 RX ADMIN — TOBRAMYCIN 460 MG: 40 INJECTION INTRAMUSCULAR; INTRAVENOUS at 09:55

## 2025-07-07 RX ADMIN — ONDANSETRON 4 MG: 2 INJECTION INTRAMUSCULAR; INTRAVENOUS at 11:02

## 2025-07-07 NOTE — OP NOTE
Pre-procedure diagnosis:   Benign prostatic hyperplasia with urinary obstruction    Post-procedure diagnosis:   SAME    Procedure performed:   Aquablation CPT- 0421T    Surgeon  Willy Chisholm MD    Anesthesia  General    Specimens  Prostate tissue    EBL  100 cc    FINDINGS:  1. Prostate 70 cc  2.  Ureteral orifice ease were identified and avoided throughout the case.      PROCEDURE IN DETAIL:  The patient was brought to the operating room. Site and scope of surgery were confirmed with the patient. The patient’s identity was confirmed.  General anesthesia was achieved without complications. Patient was then placed in lithotomy position. Genitals were prepped and draped in standard sterile fashion. Appropriate time-out was performed. Intravenous antibiotic was given. The TRUS Stepper was mounted to the Articulating Arm and secured to OR bed. The ultrasound probe was attached to the stepper. The ultrasound probe was aligned, and confirmation made that the prostate is centered and aligned using both transverse and sagittal views. The bladder neck, verumontanum and the central/transition zones were identified. [optional] A clinical assessment of the prostate was performed measuring dimensions (height, width, length) to estimate prostate size and note prostate anatomy. The 24F AQUABEAM Handpiece was inserted into the prostatic urethra and a complete cystoscopic evaluation was performed by inspecting the prostate, bladder, and identifying the location of the verumontanum/external sphincter. The AQUABEAM Handpiece was secured to the Handpiece Articulating Arm. Confirmed alignment of AQUABEAM Handpiece and TRUS Probe to be parallel and colinear. Confirmation that AQUABEAM nozzle is centered and anterior of the bladder neck or the median lobe. The cystoscope was then retracted to visualize the verumontanum and external sphincter and the cystoscope tip was positioned just proximal to the external sphincter. Reconfirmed  alignment of the TRUS probe with the AQUABEAM Handpiece and compression applied with TRUS probe. Horizontal alignment of the Handpiece waterjet nozzle was performed. The Aquablation treatment zones were planned utilizing real-time TRUS to visualize the contour of the prostate and the depth and radial angles of resection were defined in the transverse view. In the sagittal view, the AQUABEAM nozzle is identified and position registered with software. The treatment contours were then adjusted to conform to the intended resection margins. The median lobe, bladder neck and verumontanum were marked and confirmed in the treatment contour. The Aquablation Treatment was then started following the resection contour confirmed under ultrasound guidance.     TOTAL AQUABLATION RESECTION TIME: 5 minutes 49 seconds    Once Aquablation resection was complete, cystoscopy and tissue/clot evacuation was performed using a 26 Fr cystoscope/resectoscope until light pink.  We then resected the fluffy tissue that had been destroyed and performed cautery with the bipolar loop and half-moon ball at settings of 3 and 3.  A 24 Welsh Najera catheter was inserted under ultrasound guidance. Balloon was inflated to   30 cc. Balloon positioning at bladder neck was confirmed and CBI started.Ultrasound probe was then removed and patient was brought out of lithotomy Patient was then brought to recovery in stable condition.

## 2025-07-07 NOTE — H&P
Pre-Op H&P  Stephan Gibbs  [id number]  1955      Chief complaint: Urinary difficulty      Subjective:  Patient is a 69 y.o.male presents for scheduled surgery by Dr. Chisholm.  He anticipates a TRANSURETHRAL PROSTATE AQUABLATION  today.  He reports worsening urinary symptoms over the last year.  He complains of urinary frequency, weak stream and nocturia.  He denies dysuria or gross hematuria.      Review of Systems:  Constitutional-- No fever, chills or sweats. No fatigue.  CV-- No chest pain, palpitation or syncope. + HTN, HLD  Resp-- No cough, hemoptysis.+ SOB, COPD, HOLLAND on CPAP  Skin--No rashes or lesions      Allergies: No Known Allergies      Home Meds:  Medications Prior to Admission   Medication Sig Dispense Refill Last Dose/Taking    albuterol sulfate HFA (Ventolin HFA) 108 (90 Base) MCG/ACT inhaler Inhale 2 puffs Every 4 (Four) Hours As Needed for Wheezing or Shortness of Air. 18 g 5     finasteride (PROSCAR) 5 MG tablet Take 1 tablet by mouth Daily.       gabapentin (NEURONTIN) 300 MG capsule Take 1 capsule by mouth 4 (Four) Times a Day.       rosuvastatin (CRESTOR) 20 MG tablet Take 1 tablet by mouth Daily.       tamsulosin (FLOMAX) 0.4 MG capsule 24 hr capsule Take 1 capsule by mouth Daily.       telmisartan (MICARDIS) 20 MG tablet Take 1 tablet by mouth Daily.       tiotropium bromide-olodaterol (Stiolto Respimat) 2.5-2.5 MCG/ACT aerosol solution inhaler Inhale 2 puffs Daily. 4 g 11          PMH:   Past Medical History:   Diagnosis Date    Benign prostatic hyperplasia     Chronic bronchitis     Chronic kidney disease 10/10/23    COPD (chronic obstructive pulmonary disease) 2019    Fracture, fibula 11/23/1974    Fracture, tibia and fibula 11/23/974    Hyperlipidemia     Hypertension     Sleep apnea     cpap compliant     PSH:    Past Surgical History:   Procedure Laterality Date    COLONOSCOPY      EYE SURGERY Bilateral     Cataract    HAND SURGERY  06/07/2024    Left middle finger dupuytrens  "fasciectomy including PIP release;  Left ring finger dupuytrens fasciectomy including PIP release, each additional digit; Left middle finger trigger release; Left ring finger trigger release Dr. Vitaliy Becerra    HERNIA REPAIR      Umbilical, and inguinal    KNEE SURGERY Left 10/07/2023    TKA    LEG SURGERY  1974    Compound FX repair       Immunization History:  Influenza: No  Pneumococcal: No  Tetanus: UTD    Social History:   Tobacco:   Social History     Tobacco Use   Smoking Status Every Day    Current packs/day: 1.50    Average packs/day: 1.5 packs/day for 58.1 years (87.1 ttl pk-yrs)    Types: Cigarettes    Start date: 6/1/1967    Passive exposure: Past   Smokeless Tobacco Never      Alcohol:     Social History     Substance and Sexual Activity   Alcohol Use Yes    Alcohol/week: 3.0 - 6.0 standard drinks of alcohol    Types: 3 - 6 Cans of beer per week         Physical Exam:/90 (BP Location: Right arm, Patient Position: Lying)   Pulse 86   Temp 97.7 °F (36.5 °C) (Temporal)   Resp 16   Ht 185.4 cm (73\")   Wt 110 kg (243 lb)   SpO2 94%   BMI 32.06 kg/m²       General Appearance:    Alert, cooperative, no distress, appears stated age   Head:    Normocephalic, without obvious abnormality, atraumatic   Lungs:     Clear to auscultation bilaterally, respirations unlabored    Heart:   Regular rate and rhythm, S1 and S2 normal    Abdomen:    Soft without tenderness   Extremities:   Extremities normal, atraumatic, no cyanosis or edema   Skin:   Skin color, texture, turgor normal, no rashes or lesions   Neurologic:   Grossly intact     Results Review:     LABS:  Lab Results   Component Value Date    WBC 6.26 06/23/2025    HGB 15.3 06/23/2025    HCT 48.3 06/23/2025    MCV 93.1 06/23/2025     06/23/2025    NEUTROABS 4.87 06/12/2023    GLUCOSE 146 (H) 06/23/2025    BUN 17.1 06/23/2025    CREATININE 1.11 06/23/2025     06/23/2025    K 3.9 06/23/2025     06/23/2025    CO2 24.0 06/23/2025    " CALCIUM 9.0 06/23/2025       RADIOLOGY:  Imaging Results (Last 72 Hours)       ** No results found for the last 72 hours. **            I reviewed the patient's new clinical results.    Cancer Staging (if applicable)  Cancer Patient: __ yes __no __unknown; If yes, clinical stage T:__ N:__M:__, stage group or __N/A      Impression: Benign prostatic hyperplasia with weak urinary stream      Plan: TRANSURETHRAL PROSTATE AQUABLATION       ALEXIA Cazares   7/7/2025   08:10 EDT

## 2025-07-07 NOTE — DISCHARGE INSTRUCTIONS
Home Care After aquablation  The following instructions will help you care for yourself, or be cared for upon your return home today.  These are guidelines for your care right after surgery only.     Diet  Drink plenty of liquids and eat light meals today.    Start your regular diet tomorrow.    Activity  Start normal activities in twenty-four (24) hours.  Limit heavy lifting and strenuous activity for 5-7 days.    Wound Care and Hygiene  No restrictions, start normal routine.    Anesthesia Precautions & Expectations  After anesthesia, rest for 24 hours.  Do not drive, drink alcoholic beverages or make any important decisions during this time.  General anesthesia may cause a sore throat, jaw discomfort or muscle aches.  These symptoms can last for one or two days.     What to Expect after Surgery  Mild pain or burning with voiding.  Frequency or urgency with urination.  Bladder cramps.  Minimal bleeding with voiding.    Call your Doctor  Passing clots in urine preventing bladder emptying  Severe pain not controlled by oral medication  Temperature above 101.5 degrees  Inability to urinate within eight (8) hours after surgery    Catheter care  Empty the catheter bag as it fills.  Call if it is not draining.    You can either remove the catheter at home yourself or have a fill and void trial in the office 1 after your surgery. Dr. Chisholm will have discussed this with you in preop. Sometimes we are able to remove the catheter the same day as surgery before you go home.     Home Catheter removal instructions  Attach the provided 10cc syringe to the balloon port. Pull back on the syringe to remove 10cc of fluid from the balloon. Do this 3 times. There is 30cc of fluid in the balloon. Then gently remove the catheter.     After catheter removal  It is common to have blood-tinged urine for 3-5 days.  It is common to have urgency with urination.    Urinary Leakage  It is not uncommon to have urinary leakage after this surgery,  as discussed preoperatively. Please refer to the pelvic floor exercises in your preop handout to resolve this by strengthening your pelvic floor.     Other Instructions  Burning with urination is very common.  Drink plenty of water to limit this feeling.    Follow up Appointment  You should have an appointment with Dr. Chisholm or his nurse practitioner in 4 weeks. Please call the office to make an appointment if you do not have one.

## 2025-07-07 NOTE — ANESTHESIA PROCEDURE NOTES
Airway  Reason: elective    Date/Time: 7/7/2025 9:54 AM  Airway not difficult    General Information and Staff    Patient location during procedure: OR  CRNA/CAA: David Conklin CRNA    Indications and Patient Condition  Indications for airway management: airway protection    Preoxygenated: yes  MILS not maintained throughout    Mask difficulty assessment: 3 - difficult mask (inadequate, unstable or two providers) +/- NMBA    Final Airway Details    Final airway type: endotracheal airway      Successful airway: ETT  Cuffed: yes   Successful intubation technique: video laryngoscopy  Adjuncts used in placement: intubating stylet  Endotracheal tube insertion site: oral  Blade: Knapp  Blade size: 3  ETT size (mm): 7.5  Cormack-Lehane Classification: grade I - full view of glottis  Placement verified by: chest auscultation and capnometry   Cuff volume (mL): 10  Measured from: lips  ETT/EBT  to lips (cm): 23  Number of attempts at approach: 1  Assessment: lips, teeth, and gum same as pre-op and atraumatic intubation    Additional Comments  Negative epigastric sounds, Breath sound equal bilaterally with symmetric chest rise and fall

## 2025-07-07 NOTE — NURSING NOTE
Caregiver Telephone Number    Phone Number for Ride/Caregiver: IRWIN CARBALLO (SPOUSE) [phone]     Who will be staying with you post procedure for the next 24 hours? Name and Phone Number?: IRWIN CARBALLO (SPOUSE) [phone]

## 2025-07-07 NOTE — ANESTHESIA PREPROCEDURE EVALUATION
Anesthesia Evaluation     Patient summary reviewed and Nursing notes reviewed                Airway   Mallampati: II  TM distance: >3 FB  Neck ROM: full  No difficulty expected  Dental - normal exam     Pulmonary - normal exam   (+) a smoker Current, COPD,sleep apnea on CPAP  Cardiovascular - normal exam    (+) hypertension, hyperlipidemia      Neuro/Psych- negative ROS  GI/Hepatic/Renal/Endo    (+) renal disease- CRI    Musculoskeletal (-) negative ROS    Abdominal  - normal exam    Bowel sounds: normal.   Substance History - negative use     OB/GYN negative ob/gyn ROS         Other                      Anesthesia Plan    ASA 3     general     intravenous induction     Anesthetic plan, risks, benefits, and alternatives have been provided, discussed and informed consent has been obtained with: patient.    Plan discussed with CRNA.    CODE STATUS:

## 2025-07-08 LAB
CYTO UR: NORMAL
LAB AP CASE REPORT: NORMAL
LAB AP CLINICAL INFORMATION: NORMAL
PATH REPORT.FINAL DX SPEC: NORMAL
PATH REPORT.GROSS SPEC: NORMAL

## 2025-08-07 ENCOUNTER — OFFICE VISIT (OUTPATIENT)
Dept: UROLOGY | Facility: CLINIC | Age: 70
End: 2025-08-07
Payer: MEDICARE

## 2025-08-07 VITALS
TEMPERATURE: 98.1 F | BODY MASS INDEX: 32.14 KG/M2 | HEART RATE: 90 BPM | RESPIRATION RATE: 18 BRPM | OXYGEN SATURATION: 90 % | WEIGHT: 242.51 LBS | HEIGHT: 73 IN | DIASTOLIC BLOOD PRESSURE: 80 MMHG | SYSTOLIC BLOOD PRESSURE: 140 MMHG

## 2025-08-07 DIAGNOSIS — N40.1 BENIGN PROSTATIC HYPERPLASIA WITH WEAK URINARY STREAM: Primary | ICD-10-CM

## 2025-08-07 DIAGNOSIS — R39.12 BENIGN PROSTATIC HYPERPLASIA WITH WEAK URINARY STREAM: Primary | ICD-10-CM

## 2025-08-07 DIAGNOSIS — R39.15 URGENCY OF URINATION: ICD-10-CM

## 2025-08-07 RX ORDER — OXYBUTYNIN CHLORIDE 10 MG/1
10 TABLET, EXTENDED RELEASE ORAL DAILY PRN
Qty: 30 TABLET | Refills: 1 | Status: SHIPPED | OUTPATIENT
Start: 2025-08-07

## 2025-08-07 RX ORDER — ALLOPURINOL 300 MG/1
TABLET ORAL
COMMUNITY
Start: 2025-07-23 | End: 2025-08-07

## (undated) DEVICE — EVAC BLDR UROVAC W ADAPT

## (undated) DEVICE — SYRINGE,TOOMEY,IRRIGATION,70CC,STERILE: Brand: MEDLINE

## (undated) DEVICE — COVER,MAYO STAND,XL,STERILE: Brand: MEDLINE

## (undated) DEVICE — SYR CATH/TIP 50ML 2OZ STRL 1P/U

## (undated) DEVICE — PK DRP AQUABEAM LITHO 65X69IN

## (undated) DEVICE — CANISTER, RIGID, 2000CC: Brand: MEDLINE INDUSTRIES, INC.

## (undated) DEVICE — STERILE ULTRASOUND GEL, SAFEWRAP: Brand: ECOVUE

## (undated) DEVICE — TUBING, SUCTION, 1/4" X 10', STRAIGHT: Brand: MEDLINE

## (undated) DEVICE — DRAINBAG,ANTI-REFLUX TOWER,L/F,2000ML,LL: Brand: MEDLINE

## (undated) DEVICE — 24FR BIPLR COAG ELECTRDE, STERILE: Brand: N.A.

## (undated) DEVICE — PK CYSTO-TUR BASIC 10

## (undated) DEVICE — GLV SURG SIGNATURE ESSENTIAL PF LTX SZ7

## (undated) DEVICE — Device: Brand: AQUABEAM HANDPIECE

## (undated) DEVICE — CUTTING LOOP, BIPOLAR, 24/26 FR.: Brand: N.A.